# Patient Record
Sex: FEMALE | Race: WHITE | NOT HISPANIC OR LATINO | Employment: FULL TIME | URBAN - METROPOLITAN AREA
[De-identification: names, ages, dates, MRNs, and addresses within clinical notes are randomized per-mention and may not be internally consistent; named-entity substitution may affect disease eponyms.]

---

## 2020-12-21 ENCOUNTER — HOSPITAL ENCOUNTER (OUTPATIENT)
Dept: LAB | Facility: MEDICAL CENTER | Age: 70
End: 2020-12-21
Payer: COMMERCIAL

## 2020-12-21 LAB
COVID ORDER STATUS COVID19: NORMAL
SARS-COV-2 RNA RESP QL NAA+PROBE: NOTDETECTED
SPECIMEN SOURCE: NORMAL

## 2021-01-15 DIAGNOSIS — Z23 NEED FOR VACCINATION: ICD-10-CM

## 2021-08-17 ENCOUNTER — TELEPHONE (OUTPATIENT)
Dept: SCHEDULING | Facility: IMAGING CENTER | Age: 71
End: 2021-08-17

## 2021-09-20 ENCOUNTER — HOSPITAL ENCOUNTER (OUTPATIENT)
Facility: MEDICAL CENTER | Age: 71
End: 2021-09-20
Attending: NURSE PRACTITIONER
Payer: MEDICARE

## 2021-09-20 ENCOUNTER — OFFICE VISIT (OUTPATIENT)
Dept: MEDICAL GROUP | Facility: MEDICAL CENTER | Age: 71
End: 2021-09-20
Payer: MEDICARE

## 2021-09-20 VITALS
DIASTOLIC BLOOD PRESSURE: 58 MMHG | SYSTOLIC BLOOD PRESSURE: 106 MMHG | HEART RATE: 77 BPM | HEIGHT: 68 IN | WEIGHT: 131.17 LBS | OXYGEN SATURATION: 99 % | BODY MASS INDEX: 19.88 KG/M2 | TEMPERATURE: 97.7 F

## 2021-09-20 DIAGNOSIS — F41.9 ANXIETY AND DEPRESSION: ICD-10-CM

## 2021-09-20 DIAGNOSIS — Z01.419 ENCOUNTER FOR GYNECOLOGICAL EXAMINATION WITHOUT ABNORMAL FINDING: ICD-10-CM

## 2021-09-20 DIAGNOSIS — Z12.4 CERVICAL CANCER SCREENING: ICD-10-CM

## 2021-09-20 DIAGNOSIS — Z12.39 BREAST CANCER SCREENING OTHER THAN MAMMOGRAM: ICD-10-CM

## 2021-09-20 DIAGNOSIS — Z98.890 HISTORY OF LOOP ELECTRICAL EXCISION PROCEDURE (LEEP): ICD-10-CM

## 2021-09-20 DIAGNOSIS — R89.8 EOSINOPHIL COUNT RAISED: ICD-10-CM

## 2021-09-20 DIAGNOSIS — D06.9 HIGH GRADE SQUAMOUS INTRAEPITHELIAL LESION (HGSIL), GRADE 3 CIN, ON BIOPSY OF CERVIX: ICD-10-CM

## 2021-09-20 DIAGNOSIS — F32.A ANXIETY AND DEPRESSION: ICD-10-CM

## 2021-09-20 DIAGNOSIS — Z79.890 ON POSTMENOPAUSAL HORMONE REPLACEMENT THERAPY: ICD-10-CM

## 2021-09-20 PROBLEM — Z86.19 HISTORY OF HELICOBACTER PYLORI INFECTION: Status: ACTIVE | Noted: 2021-09-20

## 2021-09-20 PROBLEM — Z86.018 HISTORY OF BENIGN PARATHYROID TUMOR: Status: ACTIVE | Noted: 2021-09-20

## 2021-09-20 PROCEDURE — 99204 OFFICE O/P NEW MOD 45 MIN: CPT | Mod: 25 | Performed by: NURSE PRACTITIONER

## 2021-09-20 PROCEDURE — 88175 CYTOPATH C/V AUTO FLUID REDO: CPT

## 2021-09-20 PROCEDURE — 87624 HPV HI-RISK TYP POOLED RSLT: CPT

## 2021-09-20 PROCEDURE — G0101 CA SCREEN;PELVIC/BREAST EXAM: HCPCS | Performed by: NURSE PRACTITIONER

## 2021-09-20 RX ORDER — MELOXICAM 15 MG/1
15 TABLET ORAL PRN
COMMUNITY
Start: 2021-09-12

## 2021-09-20 RX ORDER — VALACYCLOVIR HYDROCHLORIDE 500 MG/1
500 TABLET, FILM COATED ORAL PRN
COMMUNITY

## 2021-09-20 ASSESSMENT — PATIENT HEALTH QUESTIONNAIRE - PHQ9: CLINICAL INTERPRETATION OF PHQ2 SCORE: 0

## 2021-09-20 NOTE — ASSESSMENT & PLAN NOTE
Chronic issue over recent years.  Her last PCP was also hematologist, they did evaluate this thoroughly and did not find an underlying cause.  She does have allergy issues

## 2021-09-20 NOTE — PROGRESS NOTES
CC:  Pap/Well Woman Exam    History of present illness:  Shabana Bailon is 71 y.o.  postmenopausal female presenting today for well woman exam with gynecological exam and Pap smear.   She reports history of KIKI-3, LEEP procedure.  Follow-up Paps have been normal.  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  Enjoys skiing, exercises regularly.  [place]    She brings in recent labs which I have scanned into media.  A1c is 5.6.  She is on low-dose of Metformin and tolerating this well.  Cholesterol panel normal, TSH normal at 2.09.  Vitamin D also normal at 50.5.  She is on a calcium supplement    On postmenopausal hormone replacement therapy  Managed with topical estrogen and norethindrone 5 mg half tablet daily for 12 days every 3 months  She is feeling well on this balance.  Scheduled for mammogram     Anxiety and depression  Doing well at this time on Wellbutrin 250 mg daily    Eosinophil count raised  Chronic issue over recent years.  Her last PCP was also hematologist, they did evaluate this thoroughly and did not find an underlying cause.  She does have allergy issues      History reviewed. No pertinent past medical history.    Past Surgical History:   Procedure Laterality Date   • LEEP         Outpatient Encounter Medications as of 2021   Medication Sig Dispense Refill   • metformin (GLUCOPHAGE) 1000 MG tablet Take 1,000 mg by mouth every day.     • meloxicam (MOBIC) 15 MG tablet Take 15 mg by mouth as needed.     • buPROPion HCl (WELLBUTRIN PO) Take 250 mg by mouth every day.     • valACYclovir (VALTREX) 500 MG Tab Take 500 mg by mouth as needed.       No facility-administered encounter medications on file as of 2021.       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  Social History     Socioeconomic History   • Marital status: Not on file     Spouse name: Not on file   • Number of children: Not on file   • Years of education: Not on file   • Highest education level: Not on file   Occupational History   • Not on file   Tobacco Use   • Smoking status: Never Smoker   • Smokeless tobacco: Never Used   Vaping Use   • Vaping Use: Never used   Substance and Sexual Activity   • Alcohol use: Yes     Alcohol/week: 3.6 oz     Types: 6 Standard drinks or equivalent per week   • Drug use: Never   • Sexual activity: Not on file   Other Topics Concern   • Not on file   Social History Narrative   • Not on file     Social Determinants of Health     Financial Resource Strain:    • Difficulty of Paying Living Expenses:    Food Insecurity:    • Worried About Running Out of Food in the Last Year:    • Ran Out of Food in the Last Year:    Transportation Needs:    • Lack of Transportation (Medical):    • Lack of Transportation (Non-Medical):    Physical Activity:    • Days of Exercise per Week:    • Minutes of Exercise per Session:    Stress:    • Feeling of Stress :    Social Connections:    • Frequency of Communication with Friends and Family:    • Frequency of Social Gatherings with Friends and Family:    • Attends Church Services:    • Active Member of Clubs or Organizations:    • Attends Club or Organization Meetings:    • Marital Status:    Intimate Partner Violence:    • Fear of Current or Ex-Partner:    • Emotionally Abused:    • Physically Abused:    • Sexually Abused:        History reviewed. No pertinent family history.      ROS: Denies  "Weight loss, fatigue, chest pain, SOB, bowel or bladder changes. No concerning vaginal discharge or irritation, no dyspareunia or postcoital bleeding. Denies h/o migraine with aura. Denies musculoskeletal, neurological, or psychiatric problems.      /58 (BP Location: Right arm, Patient Position: Sitting, BP Cuff Size: Adult)   Pulse 77   Temp 36.5 °C (97.7 °F) (Temporal)   Ht 1.727 m (5' 8\")   Wt 59.5 kg (131 lb 2.8 oz)   SpO2 99%   BMI 19.94 kg/m²     GEN:  Appears well and in no apparent distress   NECK:  Supple without adenopathy or thyromegaly  LUNGS:  Clear and equal. No wheeze, ronchi, or rales.  CV:  RRR, S1, S2. No murmur.  Pedal pulses 2+ bilaterally.  BREAST:  Symmetrical without masses. No nipple discharge.  ABD:  Soft, non-tender, non-distended, normal bowel sounds.  No hepatosplenomegaly.  :  Normal external female genitalia.  Vaginal canal clear.  Cervix appears normal. Specimen collected from transformation zone. Bimanual exam:  No CMT, normal size uterus without masses or tenderness; no adnexal masses or tenderness.      Assessment and plan    1. Encounter for gynecological examination without abnormal finding  Normal GYN exam.  Pap sent, follow-up pending result.  Scheduled for mammogram.  General health and wellness discussion including healthy diet, regular exercise  - THINPREP PAP WITH HPV; Future    2. On postmenopausal hormone replacement therapy  Doing well on current HRT    3. History of loop electrical excision procedure (LEEP)    4. High grade squamous intraepithelial lesion (HGSIL), grade 3 KIKI, on biopsy of cervix  History of LEEP, most recent Pap normal    5. Anxiety and depression  Stable on Wellbutrin    6. Eosinophil count raised  Labs reviewed, scanned into media.  She has had thorough work-up in the past      F/u pending results    "

## 2021-09-20 NOTE — ASSESSMENT & PLAN NOTE
Managed with topical estrogen and norethindrone 5 mg half tablet daily for 12 days every 3 months  She is feeling well on this balance.  Scheduled for mammogram

## 2021-09-21 DIAGNOSIS — Z01.419 ENCOUNTER FOR GYNECOLOGICAL EXAMINATION WITHOUT ABNORMAL FINDING: ICD-10-CM

## 2021-09-21 LAB
CYTOLOGY REG CYTOL: NORMAL
HPV HR 12 DNA CVX QL NAA+PROBE: NEGATIVE
HPV16 DNA SPEC QL NAA+PROBE: NEGATIVE
HPV18 DNA SPEC QL NAA+PROBE: NEGATIVE
SPECIMEN SOURCE: NORMAL

## 2021-12-23 ENCOUNTER — APPOINTMENT (OUTPATIENT)
Dept: MEDICAL GROUP | Facility: MEDICAL CENTER | Age: 71
End: 2021-12-23
Payer: MEDICARE

## 2021-12-23 ENCOUNTER — OFFICE VISIT (OUTPATIENT)
Dept: MEDICAL GROUP | Facility: MEDICAL CENTER | Age: 71
End: 2021-12-23
Payer: MEDICARE

## 2021-12-23 VITALS
BODY MASS INDEX: 19.88 KG/M2 | OXYGEN SATURATION: 92 % | SYSTOLIC BLOOD PRESSURE: 118 MMHG | DIASTOLIC BLOOD PRESSURE: 58 MMHG | HEIGHT: 68 IN | TEMPERATURE: 98 F | HEART RATE: 75 BPM | WEIGHT: 131.17 LBS

## 2021-12-23 DIAGNOSIS — Z23 IMMUNIZATION DUE: ICD-10-CM

## 2021-12-23 DIAGNOSIS — E21.0 PRIMARY HYPERPARATHYROIDISM (HCC): ICD-10-CM

## 2021-12-23 DIAGNOSIS — Z79.890 ON POSTMENOPAUSAL HORMONE REPLACEMENT THERAPY: ICD-10-CM

## 2021-12-23 DIAGNOSIS — R89.8 EOSINOPHIL COUNT RAISED: ICD-10-CM

## 2021-12-23 DIAGNOSIS — D06.9 HIGH GRADE SQUAMOUS INTRAEPITHELIAL LESION (HGSIL), GRADE 3 CIN, ON BIOPSY OF CERVIX: ICD-10-CM

## 2021-12-23 PROBLEM — Z98.890 HISTORY OF LOOP ELECTRICAL EXCISION PROCEDURE (LEEP): Status: RESOLVED | Noted: 2021-09-20 | Resolved: 2021-12-23

## 2021-12-23 PROBLEM — Z86.018 HISTORY OF BENIGN PARATHYROID TUMOR: Status: RESOLVED | Noted: 2021-09-20 | Resolved: 2021-12-23

## 2021-12-23 PROBLEM — Z86.19 HISTORY OF HELICOBACTER PYLORI INFECTION: Status: RESOLVED | Noted: 2021-09-20 | Resolved: 2021-12-23

## 2021-12-23 PROCEDURE — 99213 OFFICE O/P EST LOW 20 MIN: CPT | Mod: 25 | Performed by: FAMILY MEDICINE

## 2021-12-23 PROCEDURE — 90471 IMMUNIZATION ADMIN: CPT | Performed by: FAMILY MEDICINE

## 2021-12-23 PROCEDURE — 90715 TDAP VACCINE 7 YRS/> IM: CPT | Performed by: FAMILY MEDICINE

## 2021-12-23 RX ORDER — VITS A,C,E/LUTEIN/MINERALS 300MCG-200
1 TABLET ORAL DAILY
COMMUNITY

## 2021-12-23 RX ORDER — ESTRADIOL 0.25 MG/.25G
GEL TOPICAL
COMMUNITY
End: 2023-10-23

## 2021-12-23 ASSESSMENT — ENCOUNTER SYMPTOMS
MYALGIAS: 0
ABDOMINAL PAIN: 0
DOUBLE VISION: 0
TINGLING: 0
PALPITATIONS: 0
FEVER: 0
BLURRED VISION: 0
NERVOUS/ANXIOUS: 0
CHILLS: 0
WEAKNESS: 0
SHORTNESS OF BREATH: 0
SORE THROAT: 0
VOMITING: 0
WEIGHT LOSS: 0
DIARRHEA: 0
DIZZINESS: 0
COUGH: 0
NAUSEA: 0
DEPRESSION: 0
CONSTIPATION: 0
BRUISES/BLEEDS EASILY: 0

## 2021-12-23 NOTE — ASSESSMENT & PLAN NOTE
Continues with estradiol 0.25 mg / 0.25 mg gel and norethindrone 5 mg half tablet daily once every 3 months.  Understands the risks of continuing hormone replacement therapy.  Gets annual mammograms, she did miss her mammogram this year, scheduled for one in January 2022

## 2021-12-23 NOTE — ASSESSMENT & PLAN NOTE
This is a chronic condition for the patient in which she reports originally her counts were all the way up to 18.  At that time she also followed with hematology who did an extensive work-up and was told that she just has an elevated eosinophil count.  Most recent blood test back in September shows elevated eosinophil to 7

## 2021-12-23 NOTE — PROGRESS NOTES
Shabana Bailon is a pleasant 71 y.o. female here to establish care.    HPI:   Primary hyperparathyroidism (HCC)  Recent blood work showed a normal PTH level.  Continues to take calcium and Vitamin D replacment    Eosinophil count raised  This is a chronic condition for the patient in which she reports originally her counts were all the way up to 18.  At that time she also followed with hematology who did an extensive work-up and was told that she just has an elevated eosinophil count.  Most recent blood test back in September shows elevated eosinophil to 7    High grade squamous intraepithelial lesion (HGSIL), grade 3 KIKI, on biopsy of cervix  Continues with annual Pap smears.    On postmenopausal hormone replacement therapy  Continues with estradiol 0.25 mg / 0.25 mg gel and norethindrone 5 mg half tablet daily once every 3 months.  Understands the risks of continuing hormone replacement therapy.  Gets annual mammograms, she did miss her mammogram this year, scheduled for one in January 2022      Health Maintenance  Annual wellness visit: Patient is due  The patient is also due for their shingles vaccinations, we were unable to provide them with this in the clinic today.  I did recommend that they can get their shingles vaccine series at their local pharmacy. And her pneumococcal vaccine also shows that it is due. Patient states that she received both of these vaccines while she was in [place] at [facility].  Hepatitis C screening: Patient is due  Mammogram: Patient is overdue.  DEXA scan: Shows that patient is due though she states she had it done either 2 to 4 years ago.    Current medicines (including changes today)  Current Outpatient Medications   Medication Sig Dispense Refill   • NORETHINDRONE ACETATE PO Take  by mouth every 3 months.     • Non Formulary Request Apply  topically as needed. Testosterone cream     • Turmeric (QC TUMERIC COMPLEX PO) Take  by mouth.     • Multiple Vitamins-Minerals  (OCUVITE-LUTEIN) Tab Take 1 Tablet by mouth every day.     • B Complex Vitamins (VITAMIN B COMPLEX PO) Take  by mouth.     • CALCIUM CARBONATE-VIT D-MIN PO Take  by mouth.     • Estradiol 0.25 MG/0.25GM Gel Place  on the skin.     • metformin (GLUCOPHAGE) 1000 MG tablet Take 500 mg by mouth 2 times a day.     • meloxicam (MOBIC) 15 MG tablet Take 15 mg by mouth as needed.     • buPROPion HCl (WELLBUTRIN PO) Take 250 mg by mouth every day.     • valACYclovir (VALTREX) 500 MG Tab Take 500 mg by mouth as needed.       No current facility-administered medications for this visit.       Past Medical/ Surgical History  She  has a past medical history of Allergy, Depression, History of benign parathyroid tumor (2021), History of Helicobacter pylori infection (2021), History of loop electrical excision procedure (LEEP) (2021), and Parathyroid disorder (HCC). She also has no past medical history of Hyperlipidemia or Hypertension.  She  has a past surgical history that includes leep; other; and nasal polypectomy.    Social History  Social History     Tobacco Use   • Smoking status: Never Smoker   • Smokeless tobacco: Never Used   Vaping Use   • Vaping Use: Never used   Substance Use Topics   • Alcohol use: Yes     Alcohol/week: 3.6 oz     Types: 6 Standard drinks or equivalent per week   • Drug use: Never     Social History     Social History Narrative   • Not on file        Family History  Family History   Problem Relation Age of Onset   • Hypertension Mother    • Asthma Mother    • Stroke Father    • No Known Problems Brother    • No Known Problems Brother      Family Status   Relation Name Status   • Mo     • Fa     • Bro Mick Alive   • Bro Lamont Alive         Review of Systems   Constitutional: Negative for chills, fever, malaise/fatigue and weight loss.   HENT: Negative for hearing loss and sore throat.    Eyes: Negative for blurred vision and double vision.   Respiratory: Negative for cough  "and shortness of breath.    Cardiovascular: Negative for chest pain, palpitations and leg swelling.   Gastrointestinal: Negative for abdominal pain, constipation, diarrhea, nausea and vomiting.   Musculoskeletal: Negative for myalgias.   Skin: Negative for rash.   Neurological: Negative for dizziness, tingling and weakness.   Endo/Heme/Allergies: Does not bruise/bleed easily.   Psychiatric/Behavioral: Negative for depression. The patient is not nervous/anxious.          Objective:     /58 (BP Location: Right arm, Patient Position: Sitting, BP Cuff Size: Adult)   Pulse 75   Temp 36.7 °C (98 °F) (Temporal)   Ht 1.727 m (5' 8\")   Wt 59.5 kg (131 lb 2.8 oz)   SpO2 92%  Body mass index is 19.94 kg/m².    Physical Exam  Constitutional:       General: She is not in acute distress.  HENT:      Head: Normocephalic and atraumatic.      Right Ear: There is impacted cerumen.      Left Ear: There is impacted cerumen.   Eyes:      General: Lids are normal.      Extraocular Movements: Extraocular movements intact.      Conjunctiva/sclera: Conjunctivae normal.      Pupils: Pupils are equal, round, and reactive to light.   Neck:      Trachea: Trachea normal.   Cardiovascular:      Rate and Rhythm: Normal rate and regular rhythm.      Heart sounds: Normal heart sounds. No murmur heard.  No friction rub. No gallop.    Pulmonary:      Effort: Pulmonary effort is normal. No accessory muscle usage.      Breath sounds: Normal breath sounds. No wheezing or rales.   Abdominal:      General: Bowel sounds are normal.      Palpations: Abdomen is soft.      Tenderness: There is no abdominal tenderness.   Musculoskeletal:      Cervical back: Normal range of motion and neck supple.      Right lower leg: No edema.      Left lower leg: No edema.   Lymphadenopathy:      Cervical: No cervical adenopathy.   Skin:     General: Skin is warm and dry.      Findings: No rash.   Neurological:      General: No focal deficit present.      Mental " Status: She is alert and oriented to person, place, and time.      GCS: GCS eye subscore is 4. GCS verbal subscore is 5. GCS motor subscore is 6.      Gait: Gait is intact.      Deep Tendon Reflexes:      Reflex Scores:       Brachioradialis reflexes are 2+ on the right side and 2+ on the left side.       Patellar reflexes are 2+ on the right side and 2+ on the left side.  Psychiatric:         Attention and Perception: Attention normal.         Mood and Affect: Affect normal.         Speech: Speech normal.        Imaging:  No recent imaging to review    Labs:  Most recent labs from 09/2021 reviewed with patient.  Assessment and Plan:   The following treatment plan was discussed    1. Primary hyperparathyroidism (HCC)  Chronic, stable.  Recommend that the patient continue taking her calcium and vitamin D supplementation.  I also recommended rechecking her PTH annually.    2. On postmenopausal hormone replacement therapy  Chronic, stable.  I did advise to stop the hormone replacement therapy due to the substantial risk for breast cancer.  Patient acknowledged this risk but would like to continue with her hormone replacement therapy.    3. Eosinophil count raised  Chronic, stable.  We will go ahead and recheck her CBC in September 2022    4. High grade squamous intraepithelial lesion (HGSIL), grade 3 KIKI, on biopsy of cervix  Chronic, stable.  We will go ahead and continue with her annual Paps starting in September.    5. Immunization due  Due for her tetanus, will go get her updated today.  - Tdap Vaccine =>6YO IM      Records requested.  Followup: Return in about 9 months (around 9/23/2022), or if symptoms worsen or fail to improve, for Annual visit.    I have placed vaccination orders.  The MA is preforming vaccination orders under the direction of Dr. Montaño    Please note that this dictation was created using voice recognition software. I have made every reasonable attempt to correct obvious errors, but I expect  that there are errors of grammar and possibly content that I did not discover before finalizing the note.

## 2022-10-14 SDOH — ECONOMIC STABILITY: HOUSING INSECURITY
IN THE LAST 12 MONTHS, WAS THERE A TIME WHEN YOU DID NOT HAVE A STEADY PLACE TO SLEEP OR SLEPT IN A SHELTER (INCLUDING NOW)?: NO

## 2022-10-14 SDOH — HEALTH STABILITY: MENTAL HEALTH
STRESS IS WHEN SOMEONE FEELS TENSE, NERVOUS, ANXIOUS, OR CAN'T SLEEP AT NIGHT BECAUSE THEIR MIND IS TROUBLED. HOW STRESSED ARE YOU?: NOT AT ALL

## 2022-10-14 SDOH — HEALTH STABILITY: PHYSICAL HEALTH: ON AVERAGE, HOW MANY MINUTES DO YOU ENGAGE IN EXERCISE AT THIS LEVEL?: 40 MIN

## 2022-10-14 SDOH — ECONOMIC STABILITY: TRANSPORTATION INSECURITY
IN THE PAST 12 MONTHS, HAS LACK OF RELIABLE TRANSPORTATION KEPT YOU FROM MEDICAL APPOINTMENTS, MEETINGS, WORK OR FROM GETTING THINGS NEEDED FOR DAILY LIVING?: NO

## 2022-10-14 SDOH — ECONOMIC STABILITY: INCOME INSECURITY: IN THE LAST 12 MONTHS, WAS THERE A TIME WHEN YOU WERE NOT ABLE TO PAY THE MORTGAGE OR RENT ON TIME?: NO

## 2022-10-14 SDOH — HEALTH STABILITY: PHYSICAL HEALTH: ON AVERAGE, HOW MANY DAYS PER WEEK DO YOU ENGAGE IN MODERATE TO STRENUOUS EXERCISE (LIKE A BRISK WALK)?: 7 DAYS

## 2022-10-14 SDOH — ECONOMIC STABILITY: FOOD INSECURITY: WITHIN THE PAST 12 MONTHS, YOU WORRIED THAT YOUR FOOD WOULD RUN OUT BEFORE YOU GOT MONEY TO BUY MORE.: NEVER TRUE

## 2022-10-14 SDOH — ECONOMIC STABILITY: HOUSING INSECURITY: IN THE LAST 12 MONTHS, HOW MANY PLACES HAVE YOU LIVED?: 1

## 2022-10-14 SDOH — ECONOMIC STABILITY: TRANSPORTATION INSECURITY
IN THE PAST 12 MONTHS, HAS THE LACK OF TRANSPORTATION KEPT YOU FROM MEDICAL APPOINTMENTS OR FROM GETTING MEDICATIONS?: NO

## 2022-10-14 SDOH — ECONOMIC STABILITY: INCOME INSECURITY: HOW HARD IS IT FOR YOU TO PAY FOR THE VERY BASICS LIKE FOOD, HOUSING, MEDICAL CARE, AND HEATING?: NOT HARD AT ALL

## 2022-10-14 SDOH — ECONOMIC STABILITY: FOOD INSECURITY: WITHIN THE PAST 12 MONTHS, THE FOOD YOU BOUGHT JUST DIDN'T LAST AND YOU DIDN'T HAVE MONEY TO GET MORE.: NEVER TRUE

## 2022-10-14 SDOH — ECONOMIC STABILITY: TRANSPORTATION INSECURITY
IN THE PAST 12 MONTHS, HAS LACK OF TRANSPORTATION KEPT YOU FROM MEETINGS, WORK, OR FROM GETTING THINGS NEEDED FOR DAILY LIVING?: NO

## 2022-10-14 ASSESSMENT — SOCIAL DETERMINANTS OF HEALTH (SDOH)
DO YOU BELONG TO ANY CLUBS OR ORGANIZATIONS SUCH AS CHURCH GROUPS UNIONS, FRATERNAL OR ATHLETIC GROUPS, OR SCHOOL GROUPS?: YES
HOW OFTEN DO YOU GET TOGETHER WITH FRIENDS OR RELATIVES?: MORE THAN THREE TIMES A WEEK
HOW OFTEN DO YOU HAVE SIX OR MORE DRINKS ON ONE OCCASION: NEVER
HOW OFTEN DO YOU ATTEND CHURCH OR RELIGIOUS SERVICES?: 1 TO 4 TIMES PER YEAR
HOW OFTEN DO YOU GET TOGETHER WITH FRIENDS OR RELATIVES?: MORE THAN THREE TIMES A WEEK
HOW MANY DRINKS CONTAINING ALCOHOL DO YOU HAVE ON A TYPICAL DAY WHEN YOU ARE DRINKING: PATIENT DOES NOT DRINK
HOW HARD IS IT FOR YOU TO PAY FOR THE VERY BASICS LIKE FOOD, HOUSING, MEDICAL CARE, AND HEATING?: NOT HARD AT ALL
WITHIN THE PAST 12 MONTHS, YOU WORRIED THAT YOUR FOOD WOULD RUN OUT BEFORE YOU GOT THE MONEY TO BUY MORE: NEVER TRUE
HOW OFTEN DO YOU ATTEND CHURCH OR RELIGIOUS SERVICES?: 1 TO 4 TIMES PER YEAR
HOW OFTEN DO YOU HAVE A DRINK CONTAINING ALCOHOL: MONTHLY OR LESS
IN A TYPICAL WEEK, HOW MANY TIMES DO YOU TALK ON THE PHONE WITH FAMILY, FRIENDS, OR NEIGHBORS?: MORE THAN THREE TIMES A WEEK
DO YOU BELONG TO ANY CLUBS OR ORGANIZATIONS SUCH AS CHURCH GROUPS UNIONS, FRATERNAL OR ATHLETIC GROUPS, OR SCHOOL GROUPS?: YES
HOW OFTEN DO YOU ATTENT MEETINGS OF THE CLUB OR ORGANIZATION YOU BELONG TO?: MORE THAN 4 TIMES PER YEAR
HOW OFTEN DO YOU ATTENT MEETINGS OF THE CLUB OR ORGANIZATION YOU BELONG TO?: MORE THAN 4 TIMES PER YEAR
IN A TYPICAL WEEK, HOW MANY TIMES DO YOU TALK ON THE PHONE WITH FAMILY, FRIENDS, OR NEIGHBORS?: MORE THAN THREE TIMES A WEEK

## 2022-10-14 ASSESSMENT — LIFESTYLE VARIABLES
AUDIT-C TOTAL SCORE: 1
SKIP TO QUESTIONS 9-10: 1
HOW OFTEN DO YOU HAVE SIX OR MORE DRINKS ON ONE OCCASION: NEVER
HOW OFTEN DO YOU HAVE A DRINK CONTAINING ALCOHOL: MONTHLY OR LESS
HOW MANY STANDARD DRINKS CONTAINING ALCOHOL DO YOU HAVE ON A TYPICAL DAY: PATIENT DOES NOT DRINK

## 2022-10-17 ENCOUNTER — HOSPITAL ENCOUNTER (OUTPATIENT)
Facility: MEDICAL CENTER | Age: 72
End: 2022-10-17
Attending: FAMILY MEDICINE
Payer: MEDICARE

## 2022-10-17 ENCOUNTER — OFFICE VISIT (OUTPATIENT)
Dept: MEDICAL GROUP | Facility: MEDICAL CENTER | Age: 72
End: 2022-10-17
Payer: MEDICARE

## 2022-10-17 VITALS
WEIGHT: 127 LBS | SYSTOLIC BLOOD PRESSURE: 122 MMHG | HEIGHT: 68 IN | OXYGEN SATURATION: 99 % | DIASTOLIC BLOOD PRESSURE: 74 MMHG | TEMPERATURE: 98 F | HEART RATE: 69 BPM | BODY MASS INDEX: 19.25 KG/M2

## 2022-10-17 DIAGNOSIS — Z01.419 ENCOUNTER FOR WELL WOMAN EXAM WITH ROUTINE GYNECOLOGICAL EXAM: Primary | ICD-10-CM

## 2022-10-17 PROCEDURE — 99000 SPECIMEN HANDLING OFFICE-LAB: CPT | Performed by: FAMILY MEDICINE

## 2022-10-17 PROCEDURE — 88175 CYTOPATH C/V AUTO FLUID REDO: CPT

## 2022-10-17 PROCEDURE — 99213 OFFICE O/P EST LOW 20 MIN: CPT | Performed by: FAMILY MEDICINE

## 2022-10-17 PROCEDURE — 87624 HPV HI-RISK TYP POOLED RSLT: CPT

## 2022-10-17 ASSESSMENT — PATIENT HEALTH QUESTIONNAIRE - PHQ9: CLINICAL INTERPRETATION OF PHQ2 SCORE: 0

## 2022-10-17 NOTE — PROGRESS NOTES
Subjective:     CC: Routine gynecological exam.      HPI:   Shabana Bailon is a 72 y.o. female who presents for annual exam    Problem   Encounter for Well Woman Exam With Routine Gynecological Exam    Patient has GYN provider: No   Last Pap Smear: 2021   H/O Abnormal Pap: Yes, 4 to 5 years ago LEEP HPV  Last Mammogram: 2022, abnormal to left breast, further imaging normal  Last Bone Density Test: 2022, osteopenia  Last Colorectal Cancer Screenin2020, cologuard  Cholesterol Screening: No copies in the chart, but completed recently   Diabetes Screening: No copies in the chart, but completed recently    Exercise: strenuous regular exercise, aerobic > 3 hours a week  Diet: Regular      No LMP recorded.  She has utilized hormone replacement therapy, transdermal.  Denies any menopausal symptoms.  No significant bloating/fluid retention, pelvic pain, or dyspareunia. No abnormal vaginal discharge.   No breast tenderness, mass, nipple discharge or changes in size or contour.    Advanced directive: Has completed, but not in the chart   Substance Abuse: No concerns  Safe in relationship.  Seat belts, bike helmet, gun safety discussed.  Sun protection used.    Immunizations  Influenza: Completed   HPV series: Aged out   Tetanus: 2021    Shingles: Completed   COVID: Due for COVID booster  Pneumococcal : Completed    Other immunizations: Hep B due           OB History   No obstetric history on file.      She  reports that she is not currently sexually active.    She  has a past medical history of Allergy, Depression, History of benign parathyroid tumor (2021), History of Helicobacter pylori infection (2021), History of loop electrical excision procedure (LEEP) (2021), and Parathyroid disorder (HCC).    She has no past medical history of Hyperlipidemia or Hypertension.  She  has a past surgical history that includes leep; other; and nasal polypectomy.    Family History   Problem Relation Age of  Onset    Hypertension Mother     Asthma Mother     Stroke Father     No Known Problems Brother     No Known Problems Brother      Social History     Tobacco Use    Smoking status: Never    Smokeless tobacco: Never   Vaping Use    Vaping Use: Never used   Substance Use Topics    Alcohol use: Yes     Alcohol/week: 3.6 oz     Types: 6 Standard drinks or equivalent per week    Drug use: Never       Patient Active Problem List    Diagnosis Date Noted    Encounter for well woman exam with routine gynecological exam 10/17/2022    On postmenopausal hormone replacement therapy 09/20/2021    High grade squamous intraepithelial lesion (HGSIL), grade 3 KIKI, on biopsy of cervix 09/20/2021    Anxiety and depression 09/20/2021    Eosinophil count raised 09/20/2021    Primary hyperparathyroidism (HCC) 09/26/2011     Current Outpatient Medications   Medication Sig Dispense Refill    CALCIUM CARBONATE-VIT D-MIN PO Take  by mouth.      Estradiol 0.25 MG/0.25GM Gel Place  on the skin.      NORETHINDRONE ACETATE PO Take  by mouth every 3 months.      Non Formulary Request Apply  topically as needed. Testosterone cream      Turmeric (QC TUMERIC COMPLEX PO) Take  by mouth.      Multiple Vitamins-Minerals (OCUVITE-LUTEIN) Tab Take 1 Tablet by mouth every day.      B Complex Vitamins (VITAMIN B COMPLEX PO) Take  by mouth.      metformin (GLUCOPHAGE) 1000 MG tablet Take 500 mg by mouth 2 times a day.      meloxicam (MOBIC) 15 MG tablet Take 15 mg by mouth as needed.      buPROPion HCl (WELLBUTRIN PO) Take 250 mg by mouth every day.      valACYclovir (VALTREX) 500 MG Tab Take 500 mg by mouth as needed.       No current facility-administered medications for this visit.     Allergies   Allergen Reactions    Codeine Vomiting    Penicillins Hives and Rash       Review of Systems   Constitutional: Negative for fever, chills and malaise/fatigue.   HENT: Negative for congestion.    Eyes: Negative for pain.   Respiratory: Negative for cough and  "shortness of breath.    Cardiovascular: Negative for chest pain and leg swelling.   Gastrointestinal: Negative for nausea, vomiting, abdominal pain and diarrhea.   Genitourinary: Negative for dysuria and hematuria.   Skin: Negative for rash.   Neurological: Negative for dizziness, focal weakness and headaches.   Endo/Heme/Allergies: Does not bruise/bleed easily.   Psychiatric/Behavioral: Negative for depression.  The patient is not nervous/anxious.      Objective:   /74 (BP Location: Left arm, Patient Position: Sitting, BP Cuff Size: Adult)   Pulse 69   Temp 36.7 °C (98 °F) (Temporal)   Ht 1.727 m (5' 8\")   Wt 57.6 kg (127 lb)   SpO2 99%   BMI 19.31 kg/m²     Wt Readings from Last 4 Encounters:   10/17/22 57.6 kg (127 lb)   12/23/21 59.5 kg (131 lb 2.8 oz)   09/20/21 59.5 kg (131 lb 2.8 oz)       A chaperone was offered to the patient during today's exam. Chaperone name: DERRELL Haddad was present.    Physical Exam  Exam conducted with a chaperone present.   Constitutional:       General: She is not in acute distress.  HENT:      Head: Normocephalic and atraumatic.   Eyes:      Conjunctiva/sclera: Conjunctivae normal.      Pupils: Pupils are equal, round, and reactive to light.   Pulmonary:      Effort: Pulmonary effort is normal. No respiratory distress.   Abdominal:      General: There is no distension.   Genitourinary:     Pubic Area: No rash.       Labia:         Right: No rash, tenderness or lesion.         Left: No rash, tenderness or lesion.       Vagina: Normal. No tenderness.      Cervix: No cervical motion tenderness, friability, lesion or erythema.      Uterus: Normal.       Adnexa: Right adnexa normal and left adnexa normal.   Musculoskeletal:      Cervical back: Normal range of motion and neck supple.   Skin:     General: Skin is warm and dry.      Findings: No rash.   Neurological:      Mental Status: She is alert and oriented to person, place, and time.      Gait: Gait is intact. "   Psychiatric:         Mood and Affect: Affect normal.       Imaging:  No recent imaging to review    Labs:  No recent labs to review    Assessment and Plan:        Problem List Items Addressed This Visit       Encounter for well woman exam with routine gynecological exam - Primary     Patient Counseling:  --Discussed moderation in sodium/caffeine intake, saturated fat and cholesterol, caloric balance, sufficient fresh fruits/vegetables, fiber, iron, and 0.4-0.8mg of folate supplement per day (for females capable of pregnancy).  --Discussed brushing, flossing, and dental visits.   --Encouraged regular exercise, 150 mins a week of moderate to high intensity cardiovascular activity.   --Discussed tobacco, alcohol, or other drug use; availability of treatment for abuse.   --Discussed sexually transmitted infections, partner selection, use of condoms, avoidance of unintended pregnancy and contraceptive alternatives.  --Injury prevention: Discussed safety belts, safety helmets, smoke detector, etc.  -Discussed  breast self exam, mammography screening, use and side effects of HRT     Health maintenance:  Due for COVID booster   Patient counseled about skin care, diet, supplements, and exercise.  Labs per orders         Relevant Orders    THINPREP PAP WITH HPV        Follow-up: Return in about 1 year (around 10/17/2023), or if symptoms worsen or fail to improve, for annual.         Please note that this dictation was created using voice recognition software. I have made every reasonable attempt to correct obvious errors, but I expect that there are errors of grammar and possibly content that I did not discover before finalizing the note.

## 2022-10-17 NOTE — ASSESSMENT & PLAN NOTE
Patient Counseling:  --Discussed moderation in sodium/caffeine intake, saturated fat and cholesterol, caloric balance, sufficient fresh fruits/vegetables, fiber, iron, and 0.4-0.8mg of folate supplement per day (for females capable of pregnancy).  --Discussed brushing, flossing, and dental visits.   --Encouraged regular exercise, 150 mins a week of moderate to high intensity cardiovascular activity.   --Discussed tobacco, alcohol, or other drug use; availability of treatment for abuse.   --Discussed sexually transmitted infections, partner selection, use of condoms, avoidance of unintended pregnancy and contraceptive alternatives.  --Injury prevention: Discussed safety belts, safety helmets, smoke detector, etc.  -Discussed  breast self exam, mammography screening, use and side effects of HRT     Health maintenance:  Due for COVID booster   Patient counseled about skin care, diet, supplements, and exercise.  Labs per orders

## 2022-10-18 DIAGNOSIS — Z01.419 ENCOUNTER FOR WELL WOMAN EXAM WITH ROUTINE GYNECOLOGICAL EXAM: ICD-10-CM

## 2022-11-09 ENCOUNTER — PATIENT MESSAGE (OUTPATIENT)
Dept: HEALTH INFORMATION MANAGEMENT | Facility: OTHER | Age: 72
End: 2022-11-09

## 2023-10-20 SDOH — ECONOMIC STABILITY: INCOME INSECURITY: IN THE LAST 12 MONTHS, WAS THERE A TIME WHEN YOU WERE NOT ABLE TO PAY THE MORTGAGE OR RENT ON TIME?: NO

## 2023-10-20 SDOH — ECONOMIC STABILITY: INCOME INSECURITY: HOW HARD IS IT FOR YOU TO PAY FOR THE VERY BASICS LIKE FOOD, HOUSING, MEDICAL CARE, AND HEATING?: NOT HARD AT ALL

## 2023-10-20 SDOH — ECONOMIC STABILITY: FOOD INSECURITY: WITHIN THE PAST 12 MONTHS, YOU WORRIED THAT YOUR FOOD WOULD RUN OUT BEFORE YOU GOT MONEY TO BUY MORE.: NEVER TRUE

## 2023-10-20 SDOH — HEALTH STABILITY: PHYSICAL HEALTH: ON AVERAGE, HOW MANY MINUTES DO YOU ENGAGE IN EXERCISE AT THIS LEVEL?: 60 MIN

## 2023-10-20 SDOH — ECONOMIC STABILITY: HOUSING INSECURITY: IN THE LAST 12 MONTHS, HOW MANY PLACES HAVE YOU LIVED?: 1

## 2023-10-20 SDOH — ECONOMIC STABILITY: FOOD INSECURITY: WITHIN THE PAST 12 MONTHS, THE FOOD YOU BOUGHT JUST DIDN'T LAST AND YOU DIDN'T HAVE MONEY TO GET MORE.: NEVER TRUE

## 2023-10-20 SDOH — HEALTH STABILITY: PHYSICAL HEALTH: ON AVERAGE, HOW MANY DAYS PER WEEK DO YOU ENGAGE IN MODERATE TO STRENUOUS EXERCISE (LIKE A BRISK WALK)?: 7 DAYS

## 2023-10-20 ASSESSMENT — SOCIAL DETERMINANTS OF HEALTH (SDOH)
HOW MANY DRINKS CONTAINING ALCOHOL DO YOU HAVE ON A TYPICAL DAY WHEN YOU ARE DRINKING: 1 OR 2
HOW OFTEN DO YOU ATTEND CHURCH OR RELIGIOUS SERVICES?: 1 TO 4 TIMES PER YEAR
HOW OFTEN DO YOU ATTEND CHURCH OR RELIGIOUS SERVICES?: 1 TO 4 TIMES PER YEAR
DO YOU BELONG TO ANY CLUBS OR ORGANIZATIONS SUCH AS CHURCH GROUPS UNIONS, FRATERNAL OR ATHLETIC GROUPS, OR SCHOOL GROUPS?: YES
HOW OFTEN DO YOU GET TOGETHER WITH FRIENDS OR RELATIVES?: MORE THAN THREE TIMES A WEEK
HOW OFTEN DO YOU ATTENT MEETINGS OF THE CLUB OR ORGANIZATION YOU BELONG TO?: MORE THAN 4 TIMES PER YEAR
HOW OFTEN DO YOU ATTENT MEETINGS OF THE CLUB OR ORGANIZATION YOU BELONG TO?: MORE THAN 4 TIMES PER YEAR
HOW OFTEN DO YOU HAVE A DRINK CONTAINING ALCOHOL: 2-4 TIMES A MONTH
DO YOU BELONG TO ANY CLUBS OR ORGANIZATIONS SUCH AS CHURCH GROUPS UNIONS, FRATERNAL OR ATHLETIC GROUPS, OR SCHOOL GROUPS?: YES
IN A TYPICAL WEEK, HOW MANY TIMES DO YOU TALK ON THE PHONE WITH FAMILY, FRIENDS, OR NEIGHBORS?: MORE THAN THREE TIMES A WEEK
HOW HARD IS IT FOR YOU TO PAY FOR THE VERY BASICS LIKE FOOD, HOUSING, MEDICAL CARE, AND HEATING?: NOT HARD AT ALL
WITHIN THE PAST 12 MONTHS, YOU WORRIED THAT YOUR FOOD WOULD RUN OUT BEFORE YOU GOT THE MONEY TO BUY MORE: NEVER TRUE
HOW OFTEN DO YOU HAVE SIX OR MORE DRINKS ON ONE OCCASION: NEVER
IN A TYPICAL WEEK, HOW MANY TIMES DO YOU TALK ON THE PHONE WITH FAMILY, FRIENDS, OR NEIGHBORS?: MORE THAN THREE TIMES A WEEK
HOW OFTEN DO YOU GET TOGETHER WITH FRIENDS OR RELATIVES?: MORE THAN THREE TIMES A WEEK

## 2023-10-20 ASSESSMENT — LIFESTYLE VARIABLES
SKIP TO QUESTIONS 9-10: 1
AUDIT-C TOTAL SCORE: 2
HOW MANY STANDARD DRINKS CONTAINING ALCOHOL DO YOU HAVE ON A TYPICAL DAY: 1 OR 2
HOW OFTEN DO YOU HAVE A DRINK CONTAINING ALCOHOL: 2-4 TIMES A MONTH
HOW OFTEN DO YOU HAVE SIX OR MORE DRINKS ON ONE OCCASION: NEVER

## 2023-10-23 ENCOUNTER — HOSPITAL ENCOUNTER (OUTPATIENT)
Facility: MEDICAL CENTER | Age: 73
End: 2023-10-23
Attending: FAMILY MEDICINE
Payer: MEDICARE

## 2023-10-23 ENCOUNTER — OFFICE VISIT (OUTPATIENT)
Dept: MEDICAL GROUP | Facility: MEDICAL CENTER | Age: 73
End: 2023-10-23
Payer: MEDICARE

## 2023-10-23 VITALS
HEART RATE: 87 BPM | SYSTOLIC BLOOD PRESSURE: 112 MMHG | OXYGEN SATURATION: 97 % | BODY MASS INDEX: 20.04 KG/M2 | WEIGHT: 131.8 LBS | DIASTOLIC BLOOD PRESSURE: 60 MMHG | TEMPERATURE: 97.3 F

## 2023-10-23 DIAGNOSIS — Z01.419 ENCOUNTER FOR WELL WOMAN EXAM WITH ROUTINE GYNECOLOGICAL EXAM: ICD-10-CM

## 2023-10-23 DIAGNOSIS — Z00.00 ENCOUNTER FOR ANNUAL WELLNESS EXAM IN MEDICARE PATIENT: Primary | ICD-10-CM

## 2023-10-23 PROCEDURE — 3078F DIAST BP <80 MM HG: CPT | Performed by: FAMILY MEDICINE

## 2023-10-23 PROCEDURE — 87624 HPV HI-RISK TYP POOLED RSLT: CPT

## 2023-10-23 PROCEDURE — G0439 PPPS, SUBSEQ VISIT: HCPCS | Performed by: FAMILY MEDICINE

## 2023-10-23 PROCEDURE — 3074F SYST BP LT 130 MM HG: CPT | Performed by: FAMILY MEDICINE

## 2023-10-23 PROCEDURE — 88175 CYTOPATH C/V AUTO FLUID REDO: CPT

## 2023-10-23 RX ORDER — ESTRADIOL 0.75 MG/1.25G
GEL, METERED TOPICAL
COMMUNITY

## 2023-10-23 ASSESSMENT — ACTIVITIES OF DAILY LIVING (ADL): BATHING_REQUIRES_ASSISTANCE: 0

## 2023-10-23 ASSESSMENT — ENCOUNTER SYMPTOMS: GENERAL WELL-BEING: EXCELLENT

## 2023-10-23 ASSESSMENT — PATIENT HEALTH QUESTIONNAIRE - PHQ9: CLINICAL INTERPRETATION OF PHQ2 SCORE: 0

## 2023-10-23 NOTE — PROGRESS NOTES
Chief Complaint   Patient presents with    Annual Exam    Gynecologic Exam       HPI:  Shabana Bailon is a 73 y.o. here for Medicare Annual Wellness Visit     Patient Active Problem List    Diagnosis Date Noted    Encounter for annual wellness exam in Medicare patient 10/23/2023    Encounter for well woman exam with routine gynecological exam 10/17/2022    On postmenopausal hormone replacement therapy 09/20/2021    High grade squamous intraepithelial lesion (HGSIL), grade 3 KIKI, on biopsy of cervix 09/20/2021    Anxiety and depression 09/20/2021    Eosinophil count raised 09/20/2021    Primary hyperparathyroidism (HCC) 09/26/2011       Current Outpatient Medications   Medication Sig Dispense Refill    Estradiol (ESTROGEL) 0.75 MG/1.25 GM (0.06%) Gel EstroGel 1.25 gram/actuation (0.06%) transdermal gel pump      CALCIUM CARBONATE-VIT D-MIN PO Take  by mouth.      NORETHINDRONE ACETATE PO Take  by mouth every 3 months.      Non Formulary Request Apply  topically as needed. Testosterone cream      Turmeric (QC TUMERIC COMPLEX PO) Take  by mouth.      Multiple Vitamins-Minerals (OCUVITE-LUTEIN) Tab Take 1 Tablet by mouth every day.      B Complex Vitamins (VITAMIN B COMPLEX PO) Take  by mouth.      metformin (GLUCOPHAGE) 1000 MG tablet Take 500 mg by mouth 2 times a day.      meloxicam (MOBIC) 15 MG tablet Take 15 mg by mouth as needed.      buPROPion HCl (WELLBUTRIN PO) Take 250 mg by mouth every day.      valACYclovir (VALTREX) 500 MG Tab Take 500 mg by mouth as needed.       No current facility-administered medications for this visit.          Current supplements as per medication list.     Allergies: Codeine and Penicillins    Current social contact/activities: Big community, golf and playing cards. Still working     She  reports that she has never smoked. She has never used smokeless tobacco. She reports current alcohol use of about 3.6 oz of alcohol per week. She reports that she does not use drugs.  Counseling  given: Not Answered      ROS:    Gait: Uses no assistive device  Ostomy: No  Other tubes: No  Amputations: No  Chronic oxygen use: No  Last eye exam: 5-6 ago  Wears hearing aids: No   : Reports urinary leakage during the last 6 months that has not interfered at all with their daily activities or sleep.    Screening:    Depression Screening  Little interest or pleasure in doing things?  0 - not at all  Feeling down, depressed , or hopeless? 0 - not at all  Patient Health Questionnaire Score: 0     If depressive symptoms identified deferred to follow up visit unless specifically addressed in assessment and plan.    Interpretation of PHQ-9 Total Score   Score Severity   1-4 No Depression   5-9 Mild Depression   10-14 Moderate Depression   15-19 Moderately Severe Depression   20-27 Severe Depression    Screening for Cognitive Impairment  Do you or any of your friends or family members have any concern about your memory? No  Three Minute Recall (Banana, Sunrise, Chair) 2/3    Robert clock face with all 12 numbers and set the hands to show 20 past 8.  Yes    Cognitive concerns identified deferred for follow up unless specifically addressed in assessment and plan.    Fall Risk Assessment  Has the patient had two or more falls in the last year or any fall with injury in the last year?  No    Safety Assessment  Do you always wear your seatbelt?  Yes  Any changes to home needed to function safely? No  Difficulty hearing.  No  Patient counseled about all safety risks that were identified.    Functional Assessment ADLs  Are there any barriers preventing you from cooking for yourself or meeting nutritional needs?  No.    Are there any barriers preventing you from driving safely or obtaining transportation?  No.    Are there any barriers preventing you from using a telephone or calling for help?  No    Are there any barriers preventing you from shopping?  No.    Are there any barriers preventing you from taking care of your own  finances?  No    Are there any barriers preventing you from managing your medications?  No    Are there any barriers preventing you from showering, bathing or dressing yourself? No    Are there any barriers preventing you from doing housework or laundry? No  Are there any barriers preventing you from using the toilet?No  Are you currently engaging in any exercise or physical activity?  Yes.      Self-Assessment of Health  What is your perception of your health? Excellent  Do you sleep more than six hours a night? Yes  In the past 7 days, how much did pain keep you from doing your normal work? None  Do you spend quality time with family or friends (virtually or in person)? Yes  Do you usually eat a heart healthy diet that constists of a variety of fruits, vegetables, whole grains and fiber? Yes  Do you eat foods high in fat and/or Fast Food more than three times per week? No    Advance Care Planning  Do you have an Advance Directive, Living Will, Durable Power of , or POLST? Yes          is on file      Health Maintenance Summary            Overdue - Hepatitis C Screening (Once) Overdue - never done      No completion history exists for this topic.              Postponed - COVID-19 Vaccine (4 - Booster for Marge series) Postponed until 10/23/2024      11/01/2021  Imm Admin: Marge SARS-CoV-2 Vaccine    03/11/2021  Imm Admin: Marge SARS-CoV-2 Vaccine    03/11/2021  Imm Admin: Marge SARS-CoV-2 Vaccine              Mammogram (Every 2 Years) Tentatively due on 1/7/2024 01/07/2022  Outside Procedure: MP-AFICO-WLGY W/ IMPLANTS UNI W/O CAD    11/19/2020  Done              Annual Wellness Visit (Every 366 Days) Next due on 10/23/2024      10/23/2023  Subsequent Annual Wellness Visit - Includes PPPS ()              Colorectal Cancer Screening (Colon Cancer Screening Cologuard Stool (FIT DNA) - Every 3 Years) Tentatively due on 8/14/2026 08/14/2023  COLOGUARD COLON CANCER SCREENING    08/03/2020   COLOGUARD COLON CANCER SCREENING              Bone Density Scan (Every 5 Years) Tentatively due on 2/1/2027 02/01/2022  Outside Procedure: DS-BONE DENSITY STUDY (DEXA)              IMM DTaP/Tdap/Td Vaccine (2 - Td or Tdap) Next due on 12/23/2031 12/23/2021  Imm Admin: Tdap Vaccine              Pneumococcal Vaccine: 65+ Years (Series Information) Completed      07/20/2020  Imm Admin: Pneumococcal polysaccharide vaccine (PPSV-23)    07/02/2019  Imm Admin: Pneumococcal Conjugate Vaccine (Prevnar/PCV-13)              Influenza Vaccine (Series Information) Completed      09/13/2023  Imm Admin: Influenza Vaccine Adult HD    09/26/2022  Imm Admin: Influenza Vaccine Adult HD    10/15/2021  Imm Admin: Influenza Vaccine Adult HD    10/01/2018  Imm Admin: Influenza Vaccine Quad Inj (Pf)              Hepatitis A Vaccine (Hep A) (Series Information) Aged Out      No completion history exists for this topic.              Hepatitis B Vaccine (Hep B) (Series Information) Aged Out      No completion history exists for this topic.              HPV Vaccines (Series Information) Aged Out      No completion history exists for this topic.              Polio Vaccine (Inactivated Polio) (Series Information) Aged Out      No completion history exists for this topic.              Meningococcal Immunization (Series Information) Aged Out      No completion history exists for this topic.              Discontinued - Cervical Cancer Screening  Ordered on 10/23/2023        Frequency changed to Never automatically (Topic No Longer Applies)    10/17/2022  THINPREP PAP WITH HPV    10/17/2022  Pathology Gynecology Specimen    09/20/2021  THINPREP PAP WITH HPV    09/20/2021  Pathology Gynecology Specimen              Discontinued - Zoster (Shingles) Vaccines  Discontinued      11/07/2016  Imm Admin: Zoster Vaccine Live (ZVL) (Zostavax) - HISTORICAL DATA                    Patient Care Team:  CHARLES Yu as PCP - General (Family  Medicine)        Social History     Tobacco Use    Smoking status: Never    Smokeless tobacco: Never   Vaping Use    Vaping Use: Never used   Substance Use Topics    Alcohol use: Yes     Alcohol/week: 3.6 oz     Types: 6 Standard drinks or equivalent per week    Drug use: Never     Family History   Problem Relation Age of Onset    Hypertension Mother     Asthma Mother     Stroke Father     No Known Problems Brother     No Known Problems Brother      She  has a past medical history of Allergy, Depression, 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    She has no past medical history of Hyperlipidemia or Hypertension.   Past Surgical History:   Procedure Laterality Date    LEEP      NASAL POLYPECTOMY      OTHER      parathyroid surgery       Exam:   /60 (BP Location: Right arm, Patient Position: Sitting, BP Cuff Size: Adult)   Pulse 87   Temp 36.3 °C (97.3 °F) (Temporal)   Wt 59.8 kg (131 lb 12.8 oz)   SpO2 97%  Body mass index is 20.04 kg/m².    Hearing excellent.    Dentition good  Alert, oriented in no acute distress.  Eye contact is good, speech goal directed, affect calm    Physical Exam  Exam conducted with a chaperone present.   Constitutional:       General: She is not in acute distress.  HENT:      Head: Normocephalic and atraumatic.      Right Ear: Tympanic membrane and external ear normal.      Left Ear: Tympanic membrane and external ear normal.      Nose: No nasal deformity.      Mouth/Throat:      Lips: Pink.      Mouth: Mucous membranes are moist.      Pharynx: Oropharynx is clear. Uvula midline. No posterior oropharyngeal erythema.   Eyes:      General: Lids are normal.      Extraocular Movements: Extraocular movements intact.      Conjunctiva/sclera: Conjunctivae normal.      Pupils: Pupils are equal, round, and reactive to light.    Neck:      Trachea: Trachea normal.   Cardiovascular:      Rate and Rhythm: Normal rate and regular rhythm.      Heart sounds: Normal heart sounds. No murmur heard.     No friction rub. No gallop.   Pulmonary:      Effort: Pulmonary effort is normal. No accessory muscle usage.      Breath sounds: Normal breath sounds. No wheezing or rales.   Abdominal:      General: Bowel sounds are normal.      Palpations: Abdomen is soft.      Tenderness: There is no abdominal tenderness.   Genitourinary:     Pubic Area: No rash.       Labia:         Right: No rash, tenderness or lesion.         Left: No rash, tenderness or lesion.       Vagina: Normal. No tenderness.      Cervix: No cervical motion tenderness, friability, lesion or erythema.      Uterus: Normal.       Adnexa: Right adnexa normal and left adnexa normal.   Musculoskeletal:      Cervical back: Normal range of motion and neck supple.      Right lower leg: No edema.      Left lower leg: No edema.   Lymphadenopathy:      Cervical: No cervical adenopathy.   Skin:     General: Skin is warm and dry.      Findings: No rash.   Neurological:      General: No focal deficit present.      Mental Status: She is alert and oriented to person, place, and time. Mental status is at baseline.      GCS: GCS eye subscore is 4. GCS verbal subscore is 5. GCS motor subscore is 6.      Motor: No weakness.      Gait: Gait is intact.   Psychiatric:         Attention and Perception: Attention normal.         Mood and Affect: Mood and affect normal.         Speech: Speech normal.       Assessment and Plan. The following treatment and monitoring plan is recommended:    Problem List Items Addressed This Visit       Encounter for well woman exam with routine gynecological exam    Relevant Orders    THINPREP PAP WITH HPV    Encounter for annual wellness exam in Medicare patient - Primary     Services suggested: No services needed at this time  Health Care Screening: Age-appropriate preventive  services recommended by USPTF and ACIP covered by Medicare were discussed today. Services ordered if indicated and agreed upon by the patient.  Referrals offered: Community-based lifestyle interventions to reduce health risks and promote self-management and wellness, fall prevention, nutrition, physical activity, tobacco-use cessation, weight loss, and mental health services as per orders if indicated.    Discussion today about general wellness and lifestyle habits:    Prevent falls and reduce trip hazards; Cautioned about securing or removing rugs.  Have a working fire alarm and carbon monoxide detector;   Engage in regular physical activity and social activities          Relevant Orders    Subsequent Annual Wellness Visit - Includes PPPS () (Completed)       Follow-up: Return in about 1 year (around 10/23/2024) for Annual.    Please note that this dictation was created using voice recognition software. I have made every reasonable attempt to correct obvious errors, but I expect that there are errors of grammar and possibly content that I did not discover before finalizing the note.

## 2023-10-24 DIAGNOSIS — Z01.419 ENCOUNTER FOR WELL WOMAN EXAM WITH ROUTINE GYNECOLOGICAL EXAM: ICD-10-CM

## 2023-10-27 LAB
CYTOLOGIST CVX/VAG CYTO: NORMAL
CYTOLOGY CVX/VAG DOC CYTO: NORMAL
CYTOLOGY CVX/VAG DOC THIN PREP: NORMAL
HPV I/H RISK 4 DNA CVX QL PROBE+SIG AMP: NEGATIVE
NOTE NL11727A: NORMAL
OTHER STN SPEC: NORMAL
STAT OF ADQ CVX/VAG CYTO-IMP: NORMAL

## 2024-01-11 ENCOUNTER — TELEMEDICINE (OUTPATIENT)
Dept: MEDICAL GROUP | Facility: MEDICAL CENTER | Age: 74
End: 2024-01-11
Payer: MEDICARE

## 2024-01-11 VITALS — WEIGHT: 131 LBS | BODY MASS INDEX: 19.85 KG/M2 | HEIGHT: 68 IN

## 2024-01-11 DIAGNOSIS — G89.29 CHRONIC LEFT-SIDED LOW BACK PAIN WITHOUT SCIATICA: ICD-10-CM

## 2024-01-11 DIAGNOSIS — M54.50 CHRONIC LEFT-SIDED LOW BACK PAIN WITHOUT SCIATICA: ICD-10-CM

## 2024-01-11 PROCEDURE — 99214 OFFICE O/P EST MOD 30 MIN: CPT | Mod: 95 | Performed by: FAMILY MEDICINE

## 2024-01-11 RX ORDER — CYCLOBENZAPRINE HCL 5 MG
5-10 TABLET ORAL 3 TIMES DAILY PRN
Qty: 30 TABLET | Refills: 2 | Status: SHIPPED | OUTPATIENT
Start: 2024-01-11

## 2024-01-11 RX ORDER — METHYLPREDNISOLONE 4 MG/1
TABLET ORAL
Qty: 21 TABLET | Refills: 0 | Status: SHIPPED | OUTPATIENT
Start: 2024-01-11

## 2024-01-11 ASSESSMENT — ENCOUNTER SYMPTOMS
BACK PAIN: 1
DIZZINESS: 1

## 2024-01-11 NOTE — PROGRESS NOTES
Telemedicine Visit: Established Patient     This evaluation was conducted via Zoom using secure and encrypted videoconferencing technology. The patient was in their home in the [facility].    The patient's identity was confirmed and verbal consent was obtained for this virtual visit.     Patient was switched over to a virtual visit with her permission, this was due to hazardous driving conditions.    Subjective:     Chief Complaint   Patient presents with    Back Pain     Lower back pain, just started last night        Shabana Bailon is a 73 y.o. female presenting for evaluation and management of:    HPI:  Problem   Low Back Pain    Last night developed severe back pain more to her left lower back then right lower back.  Was having some back discomfort that developed a few months prior after traveling to [place] to help care for her daughter's twins.  She did not experience any back pain that was super severe until last night.  Took a Meloxicam, but didn't help.  Tried ice and hot shower, but her back is still really bad.  Denies any sciatic pain, numbness or tingling, saddle Ramsay  Prior to her back pain starting she was sleeping in different beds, not getting good sleep.  Shoveled snow a few days ago.  Worked out with pilates and spinning yesterday.  About 8pm she some CBD gummies to help with her sleep.  When she went to get something to eat she started to feel dizzy and went to lay down on the floor.  Unsure if she passed out or if this was related to the CBD Gummies.  Went back to bed and a few hours later started with her back pain.  Has never felt anything like this before.  Denies any sciatic pain.  Feels like her lower back is spasming, but the pain doesn't ever go away.  Pain with any movement at all.          Review of Systems   Musculoskeletal:  Positive for back pain (Denies sciatica).   Neurological:  Positive for dizziness (Resolved).        Allergies   Allergen Reactions    Codeine Vomiting     Penicillins Hives and Rash       Current medicines (including changes today)  Current Outpatient Medications   Medication Sig Dispense Refill    cyclobenzaprine (FLEXERIL) 5 mg tablet Take 1-2 Tablets by mouth 3 times a day as needed for Muscle Spasms or Moderate Pain. 30 Tablet 2    methylPREDNISolone (MEDROL DOSEPAK) 4 MG Tablet Therapy Pack As directed on the packaging label. 21 Tablet 0    Estradiol (ESTROGEL) 0.75 MG/1.25 GM (0.06%) Gel EstroGel 1.25 gram/actuation (0.06%) transdermal gel pump      CALCIUM CARBONATE-VIT D-MIN PO Take  by mouth.      NORETHINDRONE ACETATE PO Take  by mouth every 3 months.      Turmeric (QC TUMERIC COMPLEX PO) Take  by mouth.      Multiple Vitamins-Minerals (OCUVITE-LUTEIN) Tab Take 1 Tablet by mouth every day.      B Complex Vitamins (VITAMIN B COMPLEX PO) Take  by mouth.      metformin (GLUCOPHAGE) 1000 MG tablet Take 500 mg by mouth 2 times a day.      meloxicam (MOBIC) 15 MG tablet Take 15 mg by mouth as needed.      buPROPion HCl (WELLBUTRIN PO) Take 250 mg by mouth every day.      valACYclovir (VALTREX) 500 MG Tab Take 500 mg by mouth as needed.       No current facility-administered medications for this visit.       Patient Active Problem List    Diagnosis Date Noted    Low back pain 01/11/2024    Encounter for annual wellness exam in Medicare patient 10/23/2023    Encounter for well woman exam with routine gynecological exam 10/17/2022    On postmenopausal hormone replacement therapy 09/20/2021    High grade squamous intraepithelial lesion (HGSIL), grade 3 KIKI, on biopsy of cervix 09/20/2021    Anxiety and depression 09/20/2021    Eosinophil count raised 09/20/2021    Primary hyperparathyroidism (HCC) 09/26/2011       Family History   Problem Relation Age of Onset    Hypertension Mother     Asthma Mother     Stroke Father     No Known Problems Brother     No Known Problems Brother        She  has a past medical history of Allergy, Depression, History of benign parathyroid  "tumor (9/20/2021), History of Helicobacter pylori infection (9/20/2021), History of loop electrical excision procedure (LEEP) (9/20/2021), and Parathyroid disorder (HCC).    She has no past medical history of Hyperlipidemia or Hypertension.  She  has a past surgical history that includes leep; other; and nasal polypectomy.       Objective:   Ht 1.727 m (5' 8\")   Wt 59.4 kg (131 lb)   BMI 19.92 kg/m²     Physical Exam:  Constitutional: Alert, no distress, well-groomed.  Skin: No rashes in visible areas.  Eye: Round. Conjunctiva clear, lids normal. No icterus.   ENMT: Lips pink without lesions, good dentition, moist mucous membranes. Phonation normal.  Neck: No masses, no thyromegaly. Moves freely without pain.  CV: Pulse as reported by patient  Respiratory: Unlabored respiratory effort, no cough or audible wheeze  Psych: Alert and oriented x3, normal affect and mood.       Assessment and Plan:   The following treatment plan was discussed:        Problem List Items Addressed This Visit       Low back pain     Chronic, unstable.  Continue with daily meloxicam 15 mg daily.  Try Medrol Dosepak to decrease inflammation.  Prescription for Flexeril 5 to 10 mg sent to her preferred pharmacy.  Provided with instruction and education regarding this medication that this may increase her fall risk.  This may also induce drowsiness and avoid any alcoholic beverages or operating heavy machinery while on this medication.  Can use Tylenol for breakthrough pain.  Try heat, IcyHot, gentle stretches and exercises, massage.  Will check a lumbar x-ray for any slipped disc or arthritic changes.  Discussed that this can take anywhere from 4 to 6 weeks for heel time to any injury to the back.  Negative for any red flags, this is limited due to virtual appointment.         Relevant Medications    cyclobenzaprine (FLEXERIL) 5 mg tablet    methylPREDNISolone (MEDROL DOSEPAK) 4 MG Tablet Therapy Pack    Other Relevant Orders    DX-LUMBAR " SPINE-2 OR 3 VIEWS        Follow-up: Return if symptoms worsen or fail to improve.    Please note that this dictation was created using voice recognition software. I have made every reasonable attempt to correct obvious errors, but I expect that there are errors of grammar and possibly content that I did not discover before finalizing the note.

## 2024-01-11 NOTE — ASSESSMENT & PLAN NOTE
Chronic, unstable.  Continue with daily meloxicam 15 mg daily.  Try Medrol Dosepak to decrease inflammation.  Prescription for Flexeril 5 to 10 mg sent to her preferred pharmacy.  Provided with instruction and education regarding this medication that this may increase her fall risk.  This may also induce drowsiness and avoid any alcoholic beverages or operating heavy machinery while on this medication.  Can use Tylenol for breakthrough pain.  Try heat, IcyHot, gentle stretches and exercises, massage.  Will check a lumbar x-ray for any slipped disc or arthritic changes.  Discussed that this can take anywhere from 4 to 6 weeks for heel time to any injury to the back.  Negative for any red flags, this is limited due to virtual appointment.

## 2024-01-12 ENCOUNTER — APPOINTMENT (OUTPATIENT)
Dept: RADIOLOGY | Facility: MEDICAL CENTER | Age: 74
End: 2024-01-12
Attending: FAMILY MEDICINE
Payer: MEDICARE

## 2024-01-12 DIAGNOSIS — M54.50 CHRONIC LEFT-SIDED LOW BACK PAIN WITHOUT SCIATICA: ICD-10-CM

## 2024-01-12 DIAGNOSIS — G89.29 CHRONIC LEFT-SIDED LOW BACK PAIN WITHOUT SCIATICA: ICD-10-CM

## 2024-01-12 PROCEDURE — 72100 X-RAY EXAM L-S SPINE 2/3 VWS: CPT

## 2024-10-25 SDOH — HEALTH STABILITY: PHYSICAL HEALTH: ON AVERAGE, HOW MANY MINUTES DO YOU ENGAGE IN EXERCISE AT THIS LEVEL?: 60 MIN

## 2024-10-25 SDOH — ECONOMIC STABILITY: FOOD INSECURITY: WITHIN THE PAST 12 MONTHS, THE FOOD YOU BOUGHT JUST DIDN'T LAST AND YOU DIDN'T HAVE MONEY TO GET MORE.: NEVER TRUE

## 2024-10-25 SDOH — ECONOMIC STABILITY: INCOME INSECURITY: HOW HARD IS IT FOR YOU TO PAY FOR THE VERY BASICS LIKE FOOD, HOUSING, MEDICAL CARE, AND HEATING?: NOT HARD AT ALL

## 2024-10-25 SDOH — HEALTH STABILITY: PHYSICAL HEALTH: ON AVERAGE, HOW MANY DAYS PER WEEK DO YOU ENGAGE IN MODERATE TO STRENUOUS EXERCISE (LIKE A BRISK WALK)?: 7 DAYS

## 2024-10-25 SDOH — ECONOMIC STABILITY: FOOD INSECURITY: WITHIN THE PAST 12 MONTHS, YOU WORRIED THAT YOUR FOOD WOULD RUN OUT BEFORE YOU GOT MONEY TO BUY MORE.: NEVER TRUE

## 2024-10-25 SDOH — ECONOMIC STABILITY: INCOME INSECURITY: IN THE LAST 12 MONTHS, WAS THERE A TIME WHEN YOU WERE NOT ABLE TO PAY THE MORTGAGE OR RENT ON TIME?: NO

## 2024-10-25 ASSESSMENT — SOCIAL DETERMINANTS OF HEALTH (SDOH)
IN A TYPICAL WEEK, HOW MANY TIMES DO YOU TALK ON THE PHONE WITH FAMILY, FRIENDS, OR NEIGHBORS?: MORE THAN THREE TIMES A WEEK
HOW OFTEN DO YOU ATTENT MEETINGS OF THE CLUB OR ORGANIZATION YOU BELONG TO?: MORE THAN 4 TIMES PER YEAR
HOW HARD IS IT FOR YOU TO PAY FOR THE VERY BASICS LIKE FOOD, HOUSING, MEDICAL CARE, AND HEATING?: NOT HARD AT ALL
HOW OFTEN DO YOU ATTENT MEETINGS OF THE CLUB OR ORGANIZATION YOU BELONG TO?: MORE THAN 4 TIMES PER YEAR
DO YOU BELONG TO ANY CLUBS OR ORGANIZATIONS SUCH AS CHURCH GROUPS UNIONS, FRATERNAL OR ATHLETIC GROUPS, OR SCHOOL GROUPS?: YES
IN A TYPICAL WEEK, HOW MANY TIMES DO YOU TALK ON THE PHONE WITH FAMILY, FRIENDS, OR NEIGHBORS?: MORE THAN THREE TIMES A WEEK
HOW OFTEN DO YOU GET TOGETHER WITH FRIENDS OR RELATIVES?: MORE THAN THREE TIMES A WEEK
HOW OFTEN DO YOU HAVE SIX OR MORE DRINKS ON ONE OCCASION: NEVER
HOW OFTEN DO YOU ATTEND CHURCH OR RELIGIOUS SERVICES?: 1 TO 4 TIMES PER YEAR
HOW OFTEN DO YOU HAVE A DRINK CONTAINING ALCOHOL: 4 OR MORE TIMES A WEEK
HOW OFTEN DO YOU GET TOGETHER WITH FRIENDS OR RELATIVES?: MORE THAN THREE TIMES A WEEK
DO YOU BELONG TO ANY CLUBS OR ORGANIZATIONS SUCH AS CHURCH GROUPS UNIONS, FRATERNAL OR ATHLETIC GROUPS, OR SCHOOL GROUPS?: YES
IN THE PAST 12 MONTHS, HAS THE ELECTRIC, GAS, OIL, OR WATER COMPANY THREATENED TO SHUT OFF SERVICE IN YOUR HOME?: NO
HOW MANY DRINKS CONTAINING ALCOHOL DO YOU HAVE ON A TYPICAL DAY WHEN YOU ARE DRINKING: 1 OR 2
WITHIN THE PAST 12 MONTHS, YOU WORRIED THAT YOUR FOOD WOULD RUN OUT BEFORE YOU GOT THE MONEY TO BUY MORE: NEVER TRUE
HOW OFTEN DO YOU ATTEND CHURCH OR RELIGIOUS SERVICES?: 1 TO 4 TIMES PER YEAR

## 2024-10-25 ASSESSMENT — LIFESTYLE VARIABLES
HOW MANY STANDARD DRINKS CONTAINING ALCOHOL DO YOU HAVE ON A TYPICAL DAY: 1 OR 2
HOW OFTEN DO YOU HAVE A DRINK CONTAINING ALCOHOL: 4 OR MORE TIMES A WEEK
AUDIT-C TOTAL SCORE: 4
HOW OFTEN DO YOU HAVE SIX OR MORE DRINKS ON ONE OCCASION: NEVER
SKIP TO QUESTIONS 9-10: 1

## 2024-10-28 ENCOUNTER — APPOINTMENT (OUTPATIENT)
Dept: MEDICAL GROUP | Facility: MEDICAL CENTER | Age: 74
End: 2024-10-28
Payer: MEDICARE

## 2024-11-26 ENCOUNTER — HOSPITAL ENCOUNTER (OUTPATIENT)
Facility: MEDICAL CENTER | Age: 74
End: 2024-11-26
Attending: FAMILY MEDICINE
Payer: MEDICARE

## 2024-11-26 ENCOUNTER — OFFICE VISIT (OUTPATIENT)
Dept: MEDICAL GROUP | Facility: MEDICAL CENTER | Age: 74
End: 2024-11-26
Payer: MEDICARE

## 2024-11-26 ENCOUNTER — HOSPITAL ENCOUNTER (OUTPATIENT)
Dept: LAB | Facility: MEDICAL CENTER | Age: 74
End: 2024-11-26
Attending: FAMILY MEDICINE
Payer: MEDICARE

## 2024-11-26 VITALS
BODY MASS INDEX: 21.67 KG/M2 | OXYGEN SATURATION: 96 % | TEMPERATURE: 97.8 F | HEART RATE: 79 BPM | HEIGHT: 68 IN | SYSTOLIC BLOOD PRESSURE: 162 MMHG | DIASTOLIC BLOOD PRESSURE: 92 MMHG | WEIGHT: 143 LBS

## 2024-11-26 DIAGNOSIS — Z01.419 WOMEN'S ANNUAL ROUTINE GYNECOLOGICAL EXAMINATION: ICD-10-CM

## 2024-11-26 DIAGNOSIS — E21.0 PRIMARY HYPERPARATHYROIDISM (HCC): ICD-10-CM

## 2024-11-26 DIAGNOSIS — Z13.6 ENCOUNTER FOR LIPID SCREENING FOR CARDIOVASCULAR DISEASE: ICD-10-CM

## 2024-11-26 DIAGNOSIS — Z00.00 ANNUAL WELLNESS VISIT: Primary | ICD-10-CM

## 2024-11-26 DIAGNOSIS — R73.09 ELEVATED HEMOGLOBIN A1C: ICD-10-CM

## 2024-11-26 DIAGNOSIS — D64.9 ANEMIA, UNSPECIFIED TYPE: ICD-10-CM

## 2024-11-26 DIAGNOSIS — M25.552 LEFT HIP PAIN: ICD-10-CM

## 2024-11-26 DIAGNOSIS — Z13.220 ENCOUNTER FOR LIPID SCREENING FOR CARDIOVASCULAR DISEASE: ICD-10-CM

## 2024-11-26 PROBLEM — L57.0 ACTINIC KERATOSIS: Status: RESOLVED | Noted: 2024-11-26 | Resolved: 2024-11-26

## 2024-11-26 PROBLEM — L40.9 PSORIASIS: Status: ACTIVE | Noted: 2024-11-26

## 2024-11-26 PROBLEM — D22.9 MELANOCYTIC NEVUS: Status: RESOLVED | Noted: 2024-11-26 | Resolved: 2024-11-26

## 2024-11-26 PROBLEM — D48.5 NEOPLASM OF UNCERTAIN BEHAVIOR OF SKIN: Status: RESOLVED | Noted: 2024-04-24 | Resolved: 2024-11-26

## 2024-11-26 PROCEDURE — 88142 CYTOPATH C/V THIN LAYER: CPT

## 2024-11-26 ASSESSMENT — PATIENT HEALTH QUESTIONNAIRE - PHQ9: CLINICAL INTERPRETATION OF PHQ2 SCORE: 0

## 2024-11-26 ASSESSMENT — ACTIVITIES OF DAILY LIVING (ADL): BATHING_REQUIRES_ASSISTANCE: 0

## 2024-11-26 ASSESSMENT — ENCOUNTER SYMPTOMS: GENERAL WELL-BEING: EXCELLENT

## 2024-11-26 NOTE — PROGRESS NOTES
Chief Complaint   Patient presents with    Annual Exam       HPI:  Shabana Bailon is a 74 y.o. here for Medicare Annual Wellness Visit     History of Present Illness  The patient is a 74-year-old female who is here for her annual wellness exam.    She was informed of low hemoglobin levels during a recent blood donation attempt. Her last comprehensive lab work was conducted approximately 2 years ago.    She has been experiencing pain in her left hip for about a month, which intensifies when walking or climbing stairs. Interestingly, she did not experience any discomfort during a recent skiing trip. The pain is not present today, possibly due to a lack of physical activity. The pain is located on the lateral side of her hip and does not worsen with pressure. She has been managing the pain with Meloxicam. She also practices Pilates on the reformer five times a week, which does not exacerbate the pain. Despite the pain, she maintains an active lifestyle.    She has a history of high blood pressure, which was first noted a few years ago during an examination. She monitored her blood pressure at home for a period but has not done so in the past year. She reports no headaches or dizziness.    She has a history of KIKI-3 of the cervix.    She had a mammogram in 04/2024 in [place] and a bone density test in 2022.    SOCIAL HISTORY  She retired in 12/2023.        Patient Active Problem List    Diagnosis Date Noted    Psoriasis 11/26/2024    Low back pain 01/11/2024    Encounter for annual wellness exam in Medicare patient 10/23/2023    Encounter for well woman exam with routine gynecological exam 10/17/2022    On postmenopausal hormone replacement therapy 09/20/2021    High grade squamous intraepithelial lesion (HGSIL), grade 3 KIKI, on biopsy of cervix 09/20/2021    Anxiety and depression 09/20/2021    Eosinophil count raised 09/20/2021    Primary hyperparathyroidism (HCC) 09/26/2011       Current Outpatient Medications    Medication Sig Dispense Refill    Estradiol (ESTROGEL) 0.75 MG/1.25 GM (0.06%) Gel EstroGel 1.25 gram/actuation (0.06%) transdermal gel pump      CALCIUM CARBONATE-VIT D-MIN PO Take  by mouth.      Turmeric (QC TUMERIC COMPLEX PO) Take  by mouth.      Multiple Vitamins-Minerals (OCUVITE-LUTEIN) Tab Take 1 Tablet by mouth every day.      B Complex Vitamins (VITAMIN B COMPLEX PO) Take  by mouth.      metformin (GLUCOPHAGE) 1000 MG tablet Take 500 mg by mouth 3 times a day.      meloxicam (MOBIC) 15 MG tablet Take 15 mg by mouth as needed.      buPROPion HCl (WELLBUTRIN PO) Take 300 mg by mouth every day.      NORETHINDRONE ACETATE PO Take  by mouth every 3 months.      valACYclovir (VALTREX) 500 MG Tab Take 500 mg by mouth as needed.       No current facility-administered medications for this visit.          Current supplements as per medication list.     Allergies: Codeine and Penicillins    Current social contact/activities: Traveling, pilaletes.     She  reports that she has never smoked. She has never used smokeless tobacco. She reports current alcohol use of about 4.2 oz of alcohol per week. She reports that she does not use drugs.  Counseling given: Not Answered      ROS:    Gait: Uses no assistive device  Ostomy: No  Other tubes: No  Amputations: No  Chronic oxygen use: No  Last eye exam: a month ago  Wears hearing aids: No   : Denies any urinary leakage during the last 6 months    Screening:    Depression Screening  Little interest or pleasure in doing things?  0 - not at all  Feeling down, depressed , or hopeless? 0 - not at all  Patient Health Questionnaire Score: 0     If depressive symptoms identified deferred to follow up visit unless specifically addressed in assessment and plan.    Interpretation of PHQ-9 Total Score   Score Severity   1-4 No Depression   5-9 Mild Depression   10-14 Moderate Depression   15-19 Moderately Severe Depression   20-27 Severe Depression    Screening for Cognitive  Impairment  Do you or any of your friends or family members have any concern about your memory? No  Three Minute Recall (Leader, Season, Table) 2/3    Robert clock face with all 12 numbers and set the hands to show 10 minutes after 11.  Yes    Cognitive concerns identified deferred for follow up unless specifically addressed in assessment and plan.    Fall Risk Assessment  Has the patient had two or more falls in the last year or any fall with injury in the last year?  No    Safety Assessment  Do you always wear your seatbelt?  Yes  Any changes to home needed to function safely? No  Difficulty hearing.  Yes  Patient counseled about all safety risks that were identified.    Functional Assessment ADLs  Are there any barriers preventing you from cooking for yourself or meeting nutritional needs?  No.    Are there any barriers preventing you from driving safely or obtaining transportation?  No.    Are there any barriers preventing you from using a telephone or calling for help?  No    Are there any barriers preventing you from shopping?  No.    Are there any barriers preventing you from taking care of your own finances?  No    Are there any barriers preventing you from managing your medications?  No    Are there any barriers preventing you from showering, bathing or dressing yourself? No    Are there any barriers preventing you from doing housework or laundry? No  Are there any barriers preventing you from using the toilet?No  Are you currently engaging in any exercise or physical activity?  Yes.      Self-Assessment of Health  What is your perception of your health? Excellent    Do you sleep more than six hours a night? Yes    In the past 7 days, how much did pain keep you from doing your normal work? A lot    Do you spend quality time with family or friends (virtually or in person)? Yes    Do you usually eat a heart healthy diet that constists of a variety of fruits, vegetables, whole grains and fiber? Yes    Do you eat  foods high in fat and/or Fast Food more than three times per week? No    How concerned are you that your medical conditions are not being well managed? Not at all    Are you worried that in the next 2 months, you may not have stable housing that you own, rent, or stay in as part of a household? No      Advance Care Planning  Do you have an Advance Directive, Living Will, Durable Power of , or POLST? Yes                 Health Maintenance Summary            Overdue - COVID-19 Vaccine (3 - 2024-25 season) Overdue since 9/1/2024 11/01/2021  Imm Admin: Marge SARS-CoV-2 Vaccine    03/11/2021  Imm Admin: Marge SARS-CoV-2 Vaccine              Overdue - Annual Wellness Visit (Yearly) Overdue since 10/23/2024      10/23/2023  Subsequent Annual Wellness Visit - Includes PPPS ()    10/23/2023  Level of Service: ANNUAL WELLNESS VISIT-INCLUDES PPPS SUBSEQUE*    10/17/2022  Level of Service: MI PREVENTIVE VISIT,NEW,65 & OVER              Mammogram (Yearly) Next due on 4/26/2025 04/26/2024  MA-SCREENING MAMMO BILAT W/TOMOSYNTHESIS W/CAD    04/14/2023  MA-SCREENING MAMMO BILAT W/TOMOSYNTHESIS W/CAD    01/07/2022  MA-DIAGNOSTIC MAMMO LEFT W/O CAD    01/05/2022  MA-SCREENING MAMMO BILAT W/TOMOSYNTHESIS W/CAD    11/19/2020  Done    Only the first 5 history entries have been loaded, but more history exists.              Colorectal Cancer Screening (Colon Cancer Screening Cologuard Stool (FIT DNA) - Every 3 Years) Tentatively due on 8/14/2026 08/14/2023  COLOGUARD RESULT component of COLOGUARD COLON CANCER SCREENING    08/03/2020  Noninvasive Colorectal Cancer DNA and Occult Blood Screening component of COLOGUARD COLON CANCER SCREENING              Bone Density Scan (Every 5 Years) Tentatively due on 2/1/2027 02/01/2022  DS-BONE DENSITY STUDY (DEXA)              IMM DTaP/Tdap/Td Vaccine (2 - Td or Tdap) Next due on 12/23/2031 12/23/2021  Imm Admin: Tdap Vaccine              Hepatitis C  Screening  Tentatively Complete      05/24/2019  Outside Procedure: HEP C VIRUS ANTIBODY              Pneumococcal Vaccine: 65+ Years (Series Information) Completed      07/20/2020  Imm Admin: Pneumococcal polysaccharide vaccine (PPSV-23)    07/02/2019  Imm Admin: Pneumococcal Conjugate Vaccine (Prevnar/PCV-13)              Influenza Vaccine (Series Information) Completed      08/20/2024  Imm Admin: Influenza high-dose trivalent (PF)    09/13/2023  Imm Admin: Influenza Vaccine Adult HD    09/26/2022  Imm Admin: Influenza Vaccine Adult HD    10/15/2021  Imm Admin: Influenza Vaccine Adult HD    10/01/2018  Imm Admin: Influenza Vaccine Quad Inj (Pf)    Only the first 5 history entries have been loaded, but more history exists.              Hepatitis A Vaccine (Hep A) (Series Information) Aged Out      No completion history exists for this topic.              Hepatitis B Vaccine (Hep B) (Series Information) Aged Out      No completion history exists for this topic.              HPV Vaccines (Series Information) Aged Out      No completion history exists for this topic.              Polio Vaccine (Inactivated Polio) (Series Information) Aged Out      No completion history exists for this topic.              Meningococcal Immunization (Series Information) Aged Out      No completion history exists for this topic.              Discontinued - Cervical Cancer Screening  Ordered on 11/26/2024        Frequency changed to Never automatically (Topic No Longer Applies)    10/23/2023  THINPREP PAP WITH HPV    10/17/2022  THINPREP PAP WITH HPV    10/17/2022  Pathology Gynecology Specimen    09/20/2021  THINPREP PAP WITH HPV    Only the first 5 history entries have been loaded, but more history exists.              Discontinued - Zoster (Shingles) Vaccines  Discontinued      11/07/2016  Imm Admin: Zoster Vaccine Live (ZVL) (Zostavax) - HISTORICAL DATA                    Patient Care Team:  CHARLES Yu as PCP - General  "(Family Medicine)        Social History     Tobacco Use    Smoking status: Never    Smokeless tobacco: Never   Vaping Use    Vaping status: Never Used   Substance Use Topics    Alcohol use: Yes     Alcohol/week: 4.2 oz     Types: 6 Glasses of wine, 1 Cans of beer per week    Drug use: Never     Family History   Problem Relation Age of Onset    Hypertension Mother     Asthma Mother     Lung Disease Mother     Stroke Father     No Known Problems Brother     No Known Problems Brother      She  has a past medical history of Allergy, Depression, 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    She has no past medical history of Hyperlipidemia or Hypertension.   Past Surgical History:   Procedure Laterality Date    LEEP      NASAL POLYPECTOMY      OTHER      parathyroid surgery       Exam:   BP (!) 162/92   Pulse 79   Temp 36.6 °C (97.8 °F) (Temporal)   Ht 1.727 m (5' 8\")   Wt 64.9 kg (143 lb)   SpO2 96%  Body mass index is 21.74 kg/m².    Hearing excellent.    Dentition good  Alert, oriented in no acute distress.  Eye contact is good, speech goal directed, affect calm    Physical Exam  Constitutional:       General: She is not in acute distress.  HENT:      Head: Normocephalic and atraumatic.      Right Ear: Tympanic membrane and external ear normal.      Left Ear: Tympanic membrane and external ear normal.      Nose: No nasal deformity.      Mouth/Throat:      Lips: Pink.      Mouth: Mucous membranes are moist.      Pharynx: Oropharynx is clear. Uvula midline. No posterior oropharyngeal erythema.   Eyes:      General: Lids are normal.      Extraocular Movements: Extraocular movements intact.      Conjunctiva/sclera: Conjunctivae normal.      Pupils: Pupils are equal, round, " and reactive to light.   Neck:      Trachea: Trachea normal.   Cardiovascular:      Rate and Rhythm: Normal rate and regular rhythm.      Heart sounds: Normal heart sounds. No murmur heard.     No friction rub. No gallop.   Pulmonary:      Effort: Pulmonary effort is normal. No accessory muscle usage.      Breath sounds: Normal breath sounds. No wheezing or rales.   Abdominal:      General: Bowel sounds are normal.      Palpations: Abdomen is soft.      Tenderness: There is no abdominal tenderness.   Genitourinary:     Labia:         Right: No rash, tenderness or lesion.         Left: No rash, tenderness or lesion.       Vagina: No vaginal discharge, tenderness or lesions.      Cervix: No friability, erythema or eversion.      Uterus: Normal.       Adnexa: Right adnexa normal and left adnexa normal.   Musculoskeletal:      Cervical back: Normal range of motion and neck supple.      Right lower leg: No edema.      Left lower leg: No edema.   Lymphadenopathy:      Cervical: No cervical adenopathy.   Skin:     General: Skin is warm and dry.      Findings: No rash.   Neurological:      General: No focal deficit present.      Mental Status: She is alert and oriented to person, place, and time. Mental status is at baseline.      GCS: GCS eye subscore is 4. GCS verbal subscore is 5. GCS motor subscore is 6.      Motor: No weakness.      Gait: Gait is intact.   Psychiatric:         Attention and Perception: Attention normal.         Mood and Affect: Mood and affect normal.         Speech: Speech normal.         Assessment and Plan. The following treatment and monitoring plan is recommended:   Assessment & Plan  1. Annual wellness visit.  Services suggested: No services needed at this time  Health Care Screening: Age-appropriate preventive services recommended by USPTF and ACIP covered by Medicare were discussed today. Services ordered if indicated and agreed upon by the patient.  Referrals offered: Community-based lifestyle  interventions to reduce health risks and promote self-management and wellness, fall prevention, nutrition, physical activity, tobacco-use cessation, weight loss, and mental health services as per orders if indicated.    Discussion today about general wellness and lifestyle habits:    Prevent falls and reduce trip hazards; Cautioned about securing or removing rugs.  Have a working fire alarm and carbon monoxide detector;   Engage in regular physical activity and social activities     2. Possible anemia.  Acute  She normally donates blood but was recently told her hemoglobin was low. A CBC, iron studies, ferritin levels, folic acid, and B12 levels have been ordered to determine if she is truly anemic.    3. Left hip pain.  Acute  She reports left hip pain for about a month, which worsens with walking or climbing stairs but not with lifting the leg. There is no increase in tenderness upon palpation, only with muscle activation. It is likely musculoskeletal rather than an issue with the hip joint. Gentle stretches and exercises have been recommended. She should continue with Tylenol and ibuprofen. A left hip x-ray has been ordered at her request.    4. Elevated blood pressure.  Subacute  Her blood pressure was recorded at 160/90 during the visit. She does not usually have high blood pressure and has not checked it at home in the last year. She is advised to monitor her blood pressure at home, especially in the evening when it is generally highest. If it remains elevated, medication may be started.    5. Health Maintenance.  A Pap smear was performed due to her history of carcinoma in situ and KIKI-3 of the cervix. Non-fasting cholesterol labs have been ordered. She is advised to get her mammogram annually despite the USPS TF recommendation changes.      Shabana was seen today for annual exam.    Diagnoses and all orders for this visit:    Annual wellness visit    Anemia, unspecified type  -     CBC WITHOUT DIFFERENTIAL;  Future  -     IRON/TOTAL IRON BIND; Future  -     FERRITIN; Future  -     VITAMIN B12; Future  -     FOLATE; Future    Left hip pain  -     DX-HIP-UNILATERAL-WITH PELVIS-1 VIEW LEFT; Future    Elevated hemoglobin A1c  -     CBC WITHOUT DIFFERENTIAL; Future  -     Comp Metabolic Panel; Future  -     ESTIMATED GFR; Future  -     HEMOGLOBIN A1C; Future    Primary hyperparathyroidism (HCC)  -     TSH+FREE T4    Encounter for lipid screening for cardiovascular disease  -     Lipid Profile; Future  -     Lipoprotein (a); Future    Women's annual routine gynecological examination  -     THINPREP PAP WITH HPV; Future        Follow-up: Return in about 1 year (around 11/26/2025) for Annual and pap.    Educated on annual wellness visit information that is covered by their insurance during these exams.  Patient informed that they may receive an additional charge for any acute complaints that are brought up during the annual wellness visit.  Patient acknowledges advisement.     please note that this dictation was created using voice recognition software. I have made every reasonable attempt to correct obvious errors, but I expect that there are errors of grammar and possibly content that I did not discover before finalizing the note.

## 2024-12-04 ENCOUNTER — PATIENT MESSAGE (OUTPATIENT)
Dept: MEDICAL GROUP | Facility: MEDICAL CENTER | Age: 74
End: 2024-12-04
Payer: MEDICARE

## 2024-12-04 ENCOUNTER — HOSPITAL ENCOUNTER (OUTPATIENT)
Dept: RADIOLOGY | Facility: MEDICAL CENTER | Age: 74
End: 2024-12-04
Attending: FAMILY MEDICINE
Payer: MEDICARE

## 2024-12-04 ENCOUNTER — HOSPITAL ENCOUNTER (OUTPATIENT)
Dept: LAB | Facility: MEDICAL CENTER | Age: 74
End: 2024-12-04
Attending: FAMILY MEDICINE
Payer: MEDICARE

## 2024-12-04 DIAGNOSIS — R73.09 ELEVATED HEMOGLOBIN A1C: ICD-10-CM

## 2024-12-04 DIAGNOSIS — Z13.6 ENCOUNTER FOR LIPID SCREENING FOR CARDIOVASCULAR DISEASE: ICD-10-CM

## 2024-12-04 DIAGNOSIS — M25.552 LEFT HIP PAIN: ICD-10-CM

## 2024-12-04 DIAGNOSIS — Z13.220 ENCOUNTER FOR LIPID SCREENING FOR CARDIOVASCULAR DISEASE: ICD-10-CM

## 2024-12-04 DIAGNOSIS — D64.9 ANEMIA, UNSPECIFIED TYPE: ICD-10-CM

## 2024-12-04 LAB
ALBUMIN SERPL BCP-MCNC: 4.4 G/DL (ref 3.2–4.9)
ALBUMIN/GLOB SERPL: 1.6 G/DL
ALP SERPL-CCNC: 81 U/L (ref 30–99)
ALT SERPL-CCNC: 24 U/L (ref 2–50)
ANION GAP SERPL CALC-SCNC: 11 MMOL/L (ref 7–16)
AST SERPL-CCNC: 25 U/L (ref 12–45)
BILIRUB SERPL-MCNC: 0.4 MG/DL (ref 0.1–1.5)
BUN SERPL-MCNC: 19 MG/DL (ref 8–22)
CALCIUM ALBUM COR SERPL-MCNC: 9.5 MG/DL (ref 8.5–10.5)
CALCIUM SERPL-MCNC: 9.8 MG/DL (ref 8.4–10.2)
CHLORIDE SERPL-SCNC: 101 MMOL/L (ref 96–112)
CHOLEST SERPL-MCNC: 158 MG/DL (ref 100–199)
CO2 SERPL-SCNC: 28 MMOL/L (ref 20–33)
CREAT SERPL-MCNC: 0.86 MG/DL (ref 0.5–1.4)
ERYTHROCYTE [DISTWIDTH] IN BLOOD BY AUTOMATED COUNT: 48 FL (ref 35.9–50)
EST. AVERAGE GLUCOSE BLD GHB EST-MCNC: 117 MG/DL
FERRITIN SERPL-MCNC: 62.9 NG/ML (ref 10–291)
FOLATE SERPL-MCNC: 31 NG/ML
GFR SERPLBLD CREATININE-BSD FMLA CKD-EPI: 71 ML/MIN/1.73 M 2
GLOBULIN SER CALC-MCNC: 2.7 G/DL (ref 1.9–3.5)
GLUCOSE SERPL-MCNC: 99 MG/DL (ref 65–99)
HBA1C MFR BLD: 5.7 % (ref 4–5.6)
HCT VFR BLD AUTO: 44.5 % (ref 37–47)
HDLC SERPL-MCNC: 72 MG/DL
HGB BLD-MCNC: 15 G/DL (ref 12–16)
IRON SATN MFR SERPL: 37 % (ref 15–55)
IRON SERPL-MCNC: 126 UG/DL (ref 40–170)
LDLC SERPL CALC-MCNC: 76 MG/DL
MCH RBC QN AUTO: 32.5 PG (ref 27–33)
MCHC RBC AUTO-ENTMCNC: 33.7 G/DL (ref 32.2–35.5)
MCV RBC AUTO: 96.5 FL (ref 81.4–97.8)
PLATELET # BLD AUTO: 403 K/UL (ref 164–446)
PMV BLD AUTO: 9.8 FL (ref 9–12.9)
POTASSIUM SERPL-SCNC: 4.3 MMOL/L (ref 3.6–5.5)
PROT SERPL-MCNC: 7.1 G/DL (ref 6–8.2)
RBC # BLD AUTO: 4.61 M/UL (ref 4.2–5.4)
SODIUM SERPL-SCNC: 140 MMOL/L (ref 135–145)
T4 FREE SERPL-MCNC: 1.44 NG/DL (ref 0.93–1.7)
TIBC SERPL-MCNC: 338 UG/DL (ref 250–450)
TRIGL SERPL-MCNC: 49 MG/DL (ref 0–149)
TSH SERPL DL<=0.005 MIU/L-ACNC: 2.15 UIU/ML (ref 0.38–5.33)
UIBC SERPL-MCNC: 212 UG/DL (ref 110–370)
VIT B12 SERPL-MCNC: 790 PG/ML (ref 211–911)
WBC # BLD AUTO: 8.6 K/UL (ref 4.8–10.8)

## 2024-12-04 PROCEDURE — 82728 ASSAY OF FERRITIN: CPT

## 2024-12-04 PROCEDURE — 83540 ASSAY OF IRON: CPT

## 2024-12-04 PROCEDURE — 83036 HEMOGLOBIN GLYCOSYLATED A1C: CPT | Mod: GA

## 2024-12-04 PROCEDURE — 83550 IRON BINDING TEST: CPT

## 2024-12-04 PROCEDURE — 84439 ASSAY OF FREE THYROXINE: CPT

## 2024-12-04 PROCEDURE — 84443 ASSAY THYROID STIM HORMONE: CPT

## 2024-12-04 PROCEDURE — 80053 COMPREHEN METABOLIC PANEL: CPT

## 2024-12-04 PROCEDURE — 85027 COMPLETE CBC AUTOMATED: CPT

## 2024-12-04 PROCEDURE — 82607 VITAMIN B-12: CPT

## 2024-12-04 PROCEDURE — 73501 X-RAY EXAM HIP UNI 1 VIEW: CPT | Mod: LT

## 2024-12-04 PROCEDURE — 36415 COLL VENOUS BLD VENIPUNCTURE: CPT

## 2024-12-04 PROCEDURE — 82746 ASSAY OF FOLIC ACID SERUM: CPT

## 2024-12-04 PROCEDURE — 83695 ASSAY OF LIPOPROTEIN(A): CPT

## 2024-12-04 PROCEDURE — 80061 LIPID PANEL: CPT

## 2024-12-04 RX ORDER — BUPROPION HYDROCHLORIDE 150 MG/1
150 TABLET, EXTENDED RELEASE ORAL 2 TIMES DAILY
COMMUNITY
End: 2024-12-04 | Stop reason: SDUPTHER

## 2024-12-05 ENCOUNTER — PATIENT MESSAGE (OUTPATIENT)
Dept: MEDICAL GROUP | Facility: MEDICAL CENTER | Age: 74
End: 2024-12-05
Payer: MEDICARE

## 2024-12-05 DIAGNOSIS — R03.0 BLOOD PRESSURE INCREASE DIASTOLIC: ICD-10-CM

## 2024-12-05 DIAGNOSIS — M16.12 PRIMARY OSTEOARTHRITIS OF LEFT HIP: ICD-10-CM

## 2024-12-05 LAB
HPV I/H RISK 1 DNA SPEC QL NAA+PROBE: NOT DETECTED
SPECIMEN SOURCE: NORMAL
THINPREP PAP, CYTOLOGY NL11781: NORMAL

## 2024-12-05 RX ORDER — BUPROPION HYDROCHLORIDE 150 MG/1
150 TABLET, EXTENDED RELEASE ORAL 2 TIMES DAILY
Qty: 180 TABLET | Refills: 3 | Status: SHIPPED | OUTPATIENT
Start: 2024-12-05

## 2024-12-05 RX ORDER — AMLODIPINE BESYLATE 5 MG/1
5 TABLET ORAL DAILY
Qty: 90 TABLET | Refills: 3 | Status: SHIPPED | OUTPATIENT
Start: 2024-12-05

## 2024-12-06 LAB — LPA SERPL-MCNC: 14 MG/DL

## 2024-12-24 ENCOUNTER — PATIENT MESSAGE (OUTPATIENT)
Dept: MEDICAL GROUP | Facility: MEDICAL CENTER | Age: 74
End: 2024-12-24
Payer: MEDICARE

## 2025-01-23 ENCOUNTER — PATIENT MESSAGE (OUTPATIENT)
Dept: MEDICAL GROUP | Facility: MEDICAL CENTER | Age: 75
End: 2025-01-23
Payer: MEDICARE

## 2025-01-23 DIAGNOSIS — R03.0 BLOOD PRESSURE INCREASE DIASTOLIC: ICD-10-CM

## 2025-01-23 RX ORDER — AMLODIPINE BESYLATE 5 MG/1
10 TABLET ORAL DAILY
Qty: 90 TABLET | Refills: 3 | Status: SHIPPED | OUTPATIENT
Start: 2025-01-23

## 2025-02-25 ENCOUNTER — PATIENT MESSAGE (OUTPATIENT)
Dept: MEDICAL GROUP | Facility: MEDICAL CENTER | Age: 75
End: 2025-02-25
Payer: MEDICARE

## 2025-02-25 ENCOUNTER — APPOINTMENT (OUTPATIENT)
Dept: PHYSICAL THERAPY | Facility: MEDICAL CENTER | Age: 75
End: 2025-02-25
Attending: FAMILY MEDICINE
Payer: MEDICARE

## 2025-02-25 DIAGNOSIS — R03.0 BLOOD PRESSURE INCREASE DIASTOLIC: ICD-10-CM

## 2025-02-25 RX ORDER — AMLODIPINE BESYLATE 10 MG/1
10 TABLET ORAL DAILY
Qty: 100 TABLET | Refills: 3 | Status: SHIPPED | OUTPATIENT
Start: 2025-02-25

## 2025-02-26 ENCOUNTER — PHYSICAL THERAPY (OUTPATIENT)
Dept: PHYSICAL THERAPY | Facility: MEDICAL CENTER | Age: 75
End: 2025-02-26
Attending: FAMILY MEDICINE
Payer: MEDICARE

## 2025-02-26 ENCOUNTER — PATIENT MESSAGE (OUTPATIENT)
Dept: MEDICAL GROUP | Facility: MEDICAL CENTER | Age: 75
End: 2025-02-26

## 2025-02-26 DIAGNOSIS — M25.552 PAIN OF LEFT HIP: ICD-10-CM

## 2025-02-26 PROCEDURE — 97161 PT EVAL LOW COMPLEX 20 MIN: CPT

## 2025-02-26 PROCEDURE — 97110 THERAPEUTIC EXERCISES: CPT

## 2025-02-26 ASSESSMENT — ENCOUNTER SYMPTOMS
ALLEVIATING FACTORS: POSITION CHANGE
EXACERBATED BY: STAIR CLIMBING
PAIN TIMING: WHEN ACTIVE
QUALITY: ACHING
PAIN SCALE AT LOWEST: 1
PAIN SCALE: 1
QUALITY: DISCOMFORT
PAIN SCALE AT HIGHEST: 6
EXACERBATED BY: WALKING
EXACERBATED BY: JUMPING

## 2025-02-26 NOTE — OP THERAPY EVALUATION
"  Outpatient Physical Therapy  INITIAL EVALUATION    [facility] Outpatient Physical Therapy  [address]  [place] [address]  Phone:  [phone]  Fax:  [phone]    Date of Evaluation: 2025    Patient: Shabana Bailon  YOB: 1950  MRN: [id number]     Referring Provider: CHARLES Yu  [address]  [address]  [place] [address]   Referring Diagnosis Unilateral primary osteoarthritis, left hip [M16.12]     Time Calculation  Start time: 904  Stop time: 945 Time Calculation (min): 41 minutes         Chief Complaint: Hip Problem    Visit Diagnoses     ICD-10-CM   1. Pain of left hip  M25.552       Date of onset of impairment: 10/26/2024    Subjective:   History of Present Illness:     Date of onset:  2024    Mechanism of injury:  Pt reports she has had L hip pain for 4-5 months. She reports a fluctuation in her hip pain that \"comes and goes\" but never completely goes away. She reports it is worse with walking, walking up stairs and sleeping. Skiing does not bother her hip. She reports pain immediately upon walking in the first 3 steps.  Pt reports external rotation stretch positions of the L hip aggravate her hip pain laterally.   Sleep disturbance:  Interrupted sleep  Pain:     Current pain ratin    At best pain ratin    At worst pain ratin    Quality:  Aching and discomfort    Pain timing:  When active    Relieving factors:  Position change    Aggravating factors:  Stair climbing, walking and jumping    Progression:  Improving  Social Support:     Lives in:  Multiple-level home    Lives with:  Significant other  Hand dominance:  Right  Patient Goals:     Patient goals for therapy:  Decreased pain and return to sport/leisure activities    Other patient goals:  Walk with less or without pain.      Past Medical History:   Diagnosis Date   • Allergy    • Depression    • History of benign parathyroid tumor 2021   • " History of Helicobacter pylori infection 09/20/2021   • History of loop electrical excision procedure (LEEP) 09/20/2021   • Melanocytic nevus 11/26/2024   • Neoplasm of uncertain behavior of skin 04/24/2024   • Parathyroid disorder (HCC)      Past Surgical History:   Procedure Laterality Date   • LEEP     • NASAL POLYPECTOMY     • OTHER      parathyroid surgery     Social History     Tobacco Use   • Smoking status: Never   • Smokeless tobacco: Never   Substance Use Topics   • Alcohol use: Yes     Alcohol/week: 4.2 oz     Types: 6 Glasses of wine, 1 Cans of beer per week     Family and Occupational History     Socioeconomic History   • Marital status:      Spouse name: Not on file   • Number of children: Not on file   • Years of education: Not on file   • Highest education level: Professional school degree (e.g., MD, DDS, DVM, MYAH)   Occupational History   • Not on file       Objective     Active Range of Motion   Left Hip   Normal active range of motion    Right Hip   Normal active range of motion    Passive Range of Motion   Left Hip   Normal passive range of motion    Right Hip   Normal passive range of motion    Strength:      Left Hip     Isolated Muscles   Gluteus minimus: 4    Right Hip     Isolated Muscles   Gluteus minimus: 4+        Therapeutic Exercises (CPT 57782):     1. Single leg bridge, x10, HEP    2. Sidelying hip abd, x10, HEP    3. Standing hip abd, x10, HEP    4. Lateral band walk, 3x15 feet, HEP      Therapeutic Exercise Summary: Pt was educated today regarding current condition, expectations for rehab potential and appropriate exercise program for home. HEP was given to patient in print out form and instructions were communicated to pt.       Time-based treatments/modalities:    Physical Therapy Timed Treatment Charges  Therapeutic exercise minutes (CPT 46265): 15 minutes      Assessment, Response and Plan:   Impairments: activity intolerance and pain with function    Assessment details:   Pt is a 75 y/o F presenting to PT with signs and symptoms consistent with L hip pain secondary to lateral hip and gluteal weakness. Pt has deficits of pain, decreased tolerance for walking. Given pt's age, overall health, current condition and adherence to PT recommendations, anticipated duration of episode is 8 weeks.      Prognosis: fair    Goals:   Short Term Goals:   1. Pt will achieve 5/5 lateral hip strength testing with MMT of glute med  2. Pt will report 25% or greater improvement of symptoms    Short term goal time span:  2-4 weeks      Long Term Goals:    1. Pt will be able to walk 1 mile or greater pain free or near pain free (2/10 or lower on numeric pain rating scale)  2. Pt will report 50% or greater improvement of symptoms  Long term goal time span:  6-8 weeks    Plan:   Therapy options:  Physical therapy treatment to continue  Planned therapy interventions:  Neuromuscular Re-education (CPT 27500), Therapeutic Exercise (CPT 31758) and Therapeutic Activities (CPT 29407)  Frequency:  2x week  Duration in weeks:  8  Discussed with:  Patient      Functional Assessment Used  WOMAC Grand Total: 11.46     Referring provider co-signature:  I have reviewed this plan of care and my co-signature certifies the need for services.    Certification Period: 02/26/2025 to  04/27/25    Physician Signature: ________________________________ Date: ______________

## 2025-02-27 ENCOUNTER — PATIENT MESSAGE (OUTPATIENT)
Dept: MEDICAL GROUP | Facility: MEDICAL CENTER | Age: 75
End: 2025-02-27
Payer: MEDICARE

## 2025-02-27 DIAGNOSIS — R73.09 ELEVATED HEMOGLOBIN A1C: ICD-10-CM

## 2025-02-28 PROBLEM — R73.09 ELEVATED HEMOGLOBIN A1C: Status: ACTIVE | Noted: 2025-02-28

## 2025-03-18 ENCOUNTER — PHYSICAL THERAPY (OUTPATIENT)
Dept: PHYSICAL THERAPY | Facility: MEDICAL CENTER | Age: 75
End: 2025-03-18
Attending: FAMILY MEDICINE
Payer: MEDICARE

## 2025-03-18 DIAGNOSIS — M25.552 PAIN OF LEFT HIP: ICD-10-CM

## 2025-03-18 PROCEDURE — 97110 THERAPEUTIC EXERCISES: CPT

## 2025-03-18 NOTE — OP THERAPY DAILY TREATMENT
"  Outpatient Physical Therapy  DAILY TREATMENT     [facility] Outpatient Physical Therapy  [address]  [place] [address]  Phone:  [phone]  Fax:  [phone]    Date: 03/18/2025    Patient: Shabana Bailon  YOB: 1950  MRN: [id number]     Time Calculation    Start time: 1416  Stop time: 1458 Time Calculation (min): 42 minutes         Chief Complaint: Hip Problem    Visit #: 2    SUBJECTIVE:  Pt reports her hip possibly feels a little bit better. She does report she has been travelling the last 10 days and has not been on her normal routine. She reports having skied today without pain.            Therapeutic Exercises (CPT 15765):     1. Single leg bridge, 2x10 B/L, HEP    2. Sidelying hip abd, 2x8, HEP    3. Standing hip abd, NT, HEP    4. Lateral band walk, NT, HEP    5. BKFO, NT    6. Isometric hip abd hooklying, x8 (8\" holds), HEP    7. lateral step up, x8 B/L, HEP    9. Nustep, 5 mins warm up      Therapeutic Exercise Summary:         Time-based treatments/modalities:    Physical Therapy Timed Treatment Charges  Therapeutic exercise minutes (CPT 45188): 42 minutes        ASSESSMENT:   Response to treatment: Pt had good tolerance for today's PT session.  Pt will continue to benefit from skilled PT to address aforementioned deficits.      PLAN/RECOMMENDATIONS:   Plan for treatment: therapy treatment to continue next visit.  Planned interventions for next visit: continue with current treatment.         "

## 2025-03-25 ENCOUNTER — APPOINTMENT (OUTPATIENT)
Dept: PHYSICAL THERAPY | Facility: MEDICAL CENTER | Age: 75
End: 2025-03-25
Attending: FAMILY MEDICINE
Payer: MEDICARE

## 2025-04-01 ENCOUNTER — APPOINTMENT (OUTPATIENT)
Dept: PHYSICAL THERAPY | Facility: MEDICAL CENTER | Age: 75
End: 2025-04-01
Attending: FAMILY MEDICINE
Payer: MEDICARE

## 2025-04-08 ENCOUNTER — APPOINTMENT (OUTPATIENT)
Dept: PHYSICAL THERAPY | Facility: MEDICAL CENTER | Age: 75
End: 2025-04-08
Attending: FAMILY MEDICINE
Payer: MEDICARE

## 2025-04-15 ENCOUNTER — APPOINTMENT (OUTPATIENT)
Dept: PHYSICAL THERAPY | Facility: MEDICAL CENTER | Age: 75
End: 2025-04-15
Attending: FAMILY MEDICINE
Payer: MEDICARE

## 2025-04-22 ENCOUNTER — APPOINTMENT (OUTPATIENT)
Dept: PHYSICAL THERAPY | Facility: MEDICAL CENTER | Age: 75
End: 2025-04-22
Attending: FAMILY MEDICINE
Payer: MEDICARE

## 2025-04-27 ENCOUNTER — PATIENT MESSAGE (OUTPATIENT)
Dept: MEDICAL GROUP | Facility: MEDICAL CENTER | Age: 75
End: 2025-04-27
Payer: MEDICARE

## 2025-04-27 DIAGNOSIS — S43.005D DISLOCATION OF LEFT SHOULDER JOINT, SUBSEQUENT ENCOUNTER: ICD-10-CM

## 2025-04-29 ENCOUNTER — APPOINTMENT (OUTPATIENT)
Dept: PHYSICAL THERAPY | Facility: MEDICAL CENTER | Age: 75
End: 2025-04-29
Attending: FAMILY MEDICINE
Payer: MEDICARE

## 2025-05-02 NOTE — Clinical Note
REFERRAL APPROVAL NOTICE         Sent on May 2, 2025                   Shabana Bailon  [address] [facility] [address]                   Dear Ms. Bailon,    After a careful review of the medical information and benefit coverage, [facility] has processed your referral. See below for additional details.    If applicable, you must be actively enrolled with your insurance for coverage of the authorized service. If you have any questions regarding your coverage, please contact your insurance directly.    REFERRAL INFORMATION   Referral #:  [id number]  Referred-To Department    Referred-By Provider:  Orthopedics    CHARLES Yu   Gabriele [facility] (joint)      [address]  [address]  [facility] [address] [phone] [address] [place] [id number] [phone]    Referral Start Date:  04/28/2025  Referral End Date:   04/28/2026             SCHEDULING  If you do not already have an appointment, please call [phone] to make an appointment.     MORE INFORMATION  If you do not already have a Riskclick account, sign up at: [email or web address]  You can access your medical information, make appointments, see lab results, billing information, and more.  If you have questions regarding this referral, please contact  the [facility] Referrals department at:             [phone]. Monday - Friday 8:00AM - 5:00PM.     Sincerely,    [facility]

## 2025-05-06 ENCOUNTER — PHYSICAL THERAPY (OUTPATIENT)
Dept: PHYSICAL THERAPY | Facility: MEDICAL CENTER | Age: 75
End: 2025-05-06
Attending: FAMILY MEDICINE
Payer: MEDICARE

## 2025-05-06 ENCOUNTER — PATIENT MESSAGE (OUTPATIENT)
Dept: MEDICAL GROUP | Facility: MEDICAL CENTER | Age: 75
End: 2025-05-06

## 2025-05-06 DIAGNOSIS — M25.552 PAIN OF LEFT HIP: ICD-10-CM

## 2025-05-06 DIAGNOSIS — M25.512 ACUTE PAIN OF LEFT SHOULDER: ICD-10-CM

## 2025-05-06 DIAGNOSIS — S43.005S SHOULDER DISLOCATION, LEFT, SEQUELA: ICD-10-CM

## 2025-05-06 PROCEDURE — 97110 THERAPEUTIC EXERCISES: CPT

## 2025-05-06 PROCEDURE — 97530 THERAPEUTIC ACTIVITIES: CPT

## 2025-05-06 NOTE — OP THERAPY DAILY TREATMENT
Outpatient Physical Therapy  DAILY TREATMENT     [facility] Outpatient Physical Therapy  [address]  [place] [address]  Phone:  [phone]  Fax:  [phone]    Date: 05/06/2025    Patient: Shabana Bailon  YOB: 1950  MRN: [id number]     Time Calculation    Start time: 1415  Stop time: 1457 Time Calculation (min): 42 minutes         Chief Complaint: Shoulder Problem    Visit #: 3    SUBJECTIVE:  Pt is being re-evaluated today for recent injury to the L shoulder following a fall while on holiday in [facility].    OBJECTIVE:  Current objective measures:   AROM:        dynamometer:   R: 1. 44, 2. 45, 3. 46    L: 1. 15, 2. 15, 3. 14           Therapeutic Exercises (CPT 92018):     1. Wall walks, x10, HEP    2. Wall walk scaption, x10, HEP    3. Shoulder isometric flexion, x7 (8 seconds), tolerance decreased as repetitions increased - HEP    4. Shoulder isometric abd, x8 (8 seconds), HEP      Therapeutic Exercise Summary:       Therapeutic Treatments and Modalities:     1. Therapeutic Activities (CPT 96153), education/re-evaluation    Time-based treatments/modalities:    Physical Therapy Timed Treatment Charges  Therapeutic activity minutes (CPT 03520): 20 minutes  Therapeutic exercise minutes (CPT 84216): 22 minutes      ASSESSMENT:   Response to treatment: See re-evaluation note.    PLAN/RECOMMENDATIONS:   Plan for treatment: therapy treatment to continue next visit.  Planned interventions for next visit: continue with current treatment.

## 2025-05-06 NOTE — OP THERAPY PROGRESS SUMMARY
Outpatient Physical Therapy  RE-EVALUATION NOTE      [facility] Outpatient Physical Therapy  [address]  [place] [address]  Phone:  [phone]  Fax:  [phone]    Date of Visit: 05/06/2025    Patient: Shabana Bailon  YOB: 1950  MRN: [id number]     Referring Provider: CHARLES Yu  [address]  [place] [address]   Referring Diagnosis Unilateral primary osteoarthritis, left hip [M16.12]     Visit Diagnoses     ICD-10-CM   1. Shoulder dislocation, left, sequela  S43.005S   2. Pain of left hip  M25.552       Rehab Potential:     Progress Report Period: 2/26/25-5/6/25  *pt was travelling aboard, this is the reason for delay between progress notes*    Functional Assessment Used          Objective Findings and Assessment:   Patient progression towards goals: New goals to be established for L shoulder case.      Objective findings and assessment details: Pt [place], she tripped on her shoe, fell forward resulting in a  L shoulder dislocation. She went to an MD in [facility] who was able to re approximate the joint. She currently reports 1/10 pain.     Flexion: 137  ABD: 50 degrees - pain/numbness in hand  ER: 62 degrees  IR: WNL's  EXT: 48    Objective testing:  Apprehension (positive)  Lupillo: positive  Painful arc: positive    Goals:   Short Term Goals:   1. Pt will achieve 90 degrees of AROM of L shoulder abd pain free or near pain free (2/10 or lower on numeric pain rating scale)  2. Pt will complete Quick DASH for baseline  Short term goal time span:  2-4 weeks      Long Term Goals:    1. Pt will achieve 90% of L  strength as compared to R  strength  2. Pt will achieve AROM of L shoulder abd within norml limits  3. Pt will achieve MDC of Quick DASH  Long term goal time span:  6-8 weeks    Plan:   Planned therapy interventions:  Neuromuscular Re-education (CPT 41267), Manual Therapy (CPT  41999), Therapeutic Exercise (CPT 95879) and Therapeutic Activities (CPT 58885)  Frequency:  2x week  Duration in weeks:  8      Referring provider co-signature:  I have reviewed this plan of care and my co-signature certifies the need for services.     Certification Period: 05/06/2025 to 07/05/25    Physician Signature: ________________________________ Date: ______________

## 2025-05-13 NOTE — Clinical Note
REFERRAL APPROVAL NOTICE         Sent on May 13, 2025                   Shabana Bailon  [address] [facility]  [place] [address]                   Dear Ms. Bailon,    After a careful review of the medical information and benefit coverage, [facility] has processed your referral. See below for additional details.    If applicable, you must be actively enrolled with your insurance for coverage of the authorized service. If you have any questions regarding your coverage, please contact your insurance directly.    REFERRAL INFORMATION   Referral #:  [id number]  Referred-To Department    Referred-By Provider:  Physical Therapy    CHARLES Yu   [facility] [address]      [address]  [address]  [facility] [id number] [phone] [address]  [address]  [facility] [id number] [phone]    Referral Start Date:  05/06/2025  Referral End Date:   05/06/2026             SCHEDULING  If you do not already have an appointment, please call [phone] to make an appointment.     MORE INFORMATION  If you do not already have a [employer]alis account, sign up at: [email or web address]  You can access your medical information, make appointments, see lab results, billing information, and more.  If you have questions regarding this referral, please contact  the [facility] Referrals department at:             [phone]. Monday - Friday 8:00AM - 5:00PM.     Sincerely,    [facility]

## 2025-05-19 DIAGNOSIS — Z00.6 CLINICAL TRIAL PARTICIPANT: ICD-10-CM

## 2025-05-19 DIAGNOSIS — Z00.6 RESEARCH STUDY PATIENT: Primary | ICD-10-CM

## 2025-05-20 ENCOUNTER — APPOINTMENT (OUTPATIENT)
Dept: PHYSICAL THERAPY | Facility: MEDICAL CENTER | Age: 75
End: 2025-05-20
Attending: FAMILY MEDICINE
Payer: MEDICARE

## 2025-05-21 ENCOUNTER — PHYSICAL THERAPY (OUTPATIENT)
Dept: PHYSICAL THERAPY | Facility: MEDICAL CENTER | Age: 75
End: 2025-05-21
Attending: FAMILY MEDICINE
Payer: MEDICARE

## 2025-05-21 DIAGNOSIS — S43.005S SHOULDER DISLOCATION, LEFT, SEQUELA: Primary | ICD-10-CM

## 2025-05-21 PROCEDURE — 97110 THERAPEUTIC EXERCISES: CPT

## 2025-05-21 PROCEDURE — 97530 THERAPEUTIC ACTIVITIES: CPT

## 2025-05-21 NOTE — OP THERAPY DAILY TREATMENT
Outpatient Physical Therapy  DAILY TREATMENT     [facility] Outpatient Physical Therapy  [address]  [place] [address]  Phone:  [phone]  Fax:  [phone]    Date: 05/21/2025    Patient: Shabana Bailon  YOB: 1950  MRN: [id number]     Time Calculation    Start time: 0933  Stop time: 1016 Time Calculation (min): 43 minutes         Chief Complaint: Shoulder Problem    Visit #: 4    SUBJECTIVE:  Pt reports her shoulder is improving, the exercises are getting easier. She does report she had an MRI. MRI findings were reviewed with patient.     OBJECTIVE:  Current objective measures:     MRI findings:  No evidence of glenohumeral joint dislocation  Humeral avulsion of glenohumeral ligament (HAGL lesion)  Tear of the superior labrum posteriorly. Tendinopathy and tenosynovitis of the long head of the biceps tendon  Grade 2 strain of the subscapularis muscle belly without evidence of rotator cuff tear          Therapeutic Exercises (CPT 11170):     1. Wall walks, x10, HEP    2. Wall walk scaption, x10, HEP    3. Shoulder isometric flexion, x7 (8 seconds), tolerance decreased as repetitions increased - HEP    4. Shoulder isometric abd, x8 (8 seconds), HEP    5. Rows, GTB 2x10, HEP    6. Pulleys, Flexion, ABD    7. IR isometrics, x8 (8 second holds), HEP    8. Resisted shoulder flexion, 1# x15, HEP    9. Resisted shoulder scaption, 1# x8, HEP    10. Sidelying abd, NT      Therapeutic Exercise Summary:       Therapeutic Treatments and Modalities:     1. Therapeutic Activities (CPT 50751), education    Therapeutic Treatment and Modalities Summary: Education was provided today regarding findings from MRI. Recommendations to continue with conservative care at this time were made. Further recommendations regarding conservative vs surgical intervention will be addressed once re-assessment is performed.     Time-based treatments/modalities:    Physical Therapy Timed  Treatment Charges  Therapeutic activity minutes (CPT 18119): 10 minutes  Therapeutic exercise minutes (CPT 96737): 33 minutes      ASSESSMENT:   Response to treatment: Pt had good tolerance for today's PT session. Her pain has been manageable with current HEP that was implemented and progressed. Her AROM of L shoulder ABD increased 35 degrees since her re-evaluation which is promising for continued management of shoulder pain with conservative care. Pt will continue to benefit from skilled PT to address aforementioned deficits.      PLAN/RECOMMENDATIONS:   Plan for treatment: therapy treatment to continue next visit.  Planned interventions for next visit: continue with current treatment.

## 2025-05-27 ENCOUNTER — PHYSICAL THERAPY (OUTPATIENT)
Dept: PHYSICAL THERAPY | Facility: MEDICAL CENTER | Age: 75
End: 2025-05-27
Attending: FAMILY MEDICINE
Payer: MEDICARE

## 2025-05-27 DIAGNOSIS — M25.512 ACUTE PAIN OF LEFT SHOULDER: Primary | ICD-10-CM

## 2025-05-27 PROCEDURE — 97110 THERAPEUTIC EXERCISES: CPT

## 2025-05-27 PROCEDURE — 97530 THERAPEUTIC ACTIVITIES: CPT

## 2025-05-27 NOTE — OP THERAPY DAILY TREATMENT
Outpatient Physical Therapy  DAILY TREATMENT     [facility] Outpatient Physical Therapy  [address]  [place] [address]  Phone:  [phone]  Fax:  [phone]    Date: 05/27/2025    Patient: Shabana Bailon  YOB: 1950  MRN: [id number]     Time Calculation    Start time: 0900  Stop time: 0944 Time Calculation (min): 44 minutes         Chief Complaint: Shoulder Problem    Visit #: 1    SUBJECTIVE:  Pt reports her sleep continues to be a major point of discomfort for her, reporting about 5-5.5 hours of sleep despite being in bed for 8-9 hours a night.     OBJECTIVE:  Current objective measures:   134 flexion  101 abd            Therapeutic Exercises (CPT 63221):     1. Wall walks, x10, HEP    2. Wall walk scaption, x10, HEP    3. Shoulder isometric flexion, x8 (8 seconds), tolerance decreased as repetitions increased - HEP    4. Shoulder isometric abd, x8 (8 seconds), HEP    5. Rows, GTB 2x10, HEP    6. Pulleys, NT    7. IR isometrics, x8 (8 second holds), HEP    8. Resisted shoulder flexion, NT, HEP    9. Resisted shoulder scaption, NT, HEP    11. Wall crcles (stability serratus press), x10 CW, x10 CCW, HEP      Therapeutic Exercise Summary:       Therapeutic Treatments and Modalities:     1. Therapeutic Activities (CPT 42607), education    Therapeutic Treatment and Modalities Summary: Education was provided today regarding findings from MRI. Recommendations to continue with conservative care at this time were made. Further recommendations regarding conservative vs surgical intervention will be addressed once re-assessment is performed.     Time-based treatments/modalities:    Physical Therapy Timed Treatment Charges  Therapeutic activity minutes (CPT 93589): 15 minutes  Therapeutic exercise minutes (CPT 91804): 29 minutes        ASSESSMENT:   Response to treatment: Pt was educated today extensively regarding progressive return to golf swings/driving range  practice with high loft irons and impact recommendations during hitting balls. Further education regarding contacting her surgeon regarding recommendations for surgery based on MRI findings. Pt will continue to benefit from skilled PT to address aforementioned deficits.     PLAN/RECOMMENDATIONS:   Plan for treatment: therapy treatment to continue next visit.  Planned interventions for next visit: continue with current treatment.

## 2025-05-28 ENCOUNTER — HOSPITAL ENCOUNTER (OUTPATIENT)
Facility: MEDICAL CENTER | Age: 75
End: 2025-05-28
Attending: FAMILY MEDICINE
Payer: SUBSIDIZED

## 2025-05-28 DIAGNOSIS — Z00.6 RESEARCH STUDY PATIENT: ICD-10-CM

## 2025-05-30 LAB
ELF SCORE: 10.01 -
HA (HYALURONIC ACID): 119.41 NG/ML
PIIINP (AMINO-TERMINAL PROPEPTIDE): 7.21 NG/ML
RELATIVE RISK: ABNORMAL
RISK GROUP: ABNORMAL
RISK: 23.6 %
TIMP-1 (TISSUE INHIBITOR OF MMP1): 251.4 NG/ML

## 2025-06-03 ENCOUNTER — PHYSICAL THERAPY (OUTPATIENT)
Dept: PHYSICAL THERAPY | Facility: MEDICAL CENTER | Age: 75
End: 2025-06-03
Attending: FAMILY MEDICINE
Payer: MEDICARE

## 2025-06-03 DIAGNOSIS — S43.005S SHOULDER DISLOCATION, LEFT, SEQUELA: ICD-10-CM

## 2025-06-03 DIAGNOSIS — M25.512 ACUTE PAIN OF LEFT SHOULDER: Primary | ICD-10-CM

## 2025-06-03 PROCEDURE — 97110 THERAPEUTIC EXERCISES: CPT

## 2025-06-03 NOTE — OP THERAPY DAILY TREATMENT
Outpatient Physical Therapy  DAILY TREATMENT     [facility] Outpatient Physical Therapy  [address]  [place] [address]  Phone:  [phone]  Fax:  [phone]    Date: 06/03/2025    Patient: Shabana Bailon  YOB: 1950  MRN: [id number]     Time Calculation    Start time: 1415  Stop time: 1457 Time Calculation (min): 42 minutes         Chief Complaint: Shoulder Problem    Visit #: 2    SUBJECTIVE:  My biggest problem is sleep. The pain is quite severe; only able to sleep about 1.5 hrs at a time. The HEP is going well. I find it challenging to raise my arm out to the side.     OBJECTIVE:      Therapeutic Exercises (CPT 74537):     1. UBE, L2 x 4 min alt    2. Ball rolls on table, multiplane flexion x 2 min    3. Ball pendulums, CW/CCW x 10 ea    4. Unilateral row, 10# 2x10    5. tricep press, orange 2x10    6. ER/IR isometric walkouts, orange x 10 ea, asx, feels strong    7. TB shoulder ER/IR, orange x 20 ea, towel under elbow    8. TB shoulder planes 4 way, orange x 10 ea, flexion and abduction were challenging, but able to produce small range      Therapeutic Exercise Summary:     Access Code: [email or web address]  URL: [email or web address]  Date: 06/03/2025  Prepared by:     Exercises  - Standing Shoulder Internal Rotation with Anchored Resistance  - 5 x weekly - 2 sets - 15 reps  - Shoulder External Rotation with Anchored Resistance  - 5 x weekly - 2 sets - 15 reps      Time-based treatments/modalities:    Physical Therapy Timed Treatment Charges  Therapeutic exercise minutes (CPT 09691): 42 minutes    ASSESSMENT:   Response to treatment: Substantial improvement on load tolerance today. Able to advance from isometric to small range RROM vs light load. Will continue to benefit from skilled PT for recovery of full functional AROM and strength progressions to promote shoulder stability and functional loading tolerance.     PLAN/RECOMMENDATIONS:   Plan for  treatment: therapy treatment to continue next visit.  Planned interventions for next visit: continue with current treatment.

## 2025-06-04 ENCOUNTER — RESULTS FOLLOW-UP (OUTPATIENT)
Dept: MEDICAL GROUP | Facility: PHYSICIAN GROUP | Age: 75
End: 2025-06-04
Payer: MEDICARE

## 2025-06-09 ENCOUNTER — RESULTS FOLLOW-UP (OUTPATIENT)
Dept: MEDICAL GROUP | Facility: PHYSICIAN GROUP | Age: 75
End: 2025-06-09
Payer: MEDICARE

## 2025-06-09 LAB
APOB+LDLR+PCSK9 GENE MUT ANL BLD/T: NOT DETECTED
BRCA1+BRCA2 DEL+DUP + FULL MUT ANL BLD/T: NOT DETECTED
MLH1+MSH2+MSH6+PMS2 GN DEL+DUP+FUL M: NOT DETECTED

## 2025-06-10 ENCOUNTER — TELEMEDICINE (OUTPATIENT)
Dept: MEDICAL GROUP | Facility: PHYSICIAN GROUP | Age: 75
End: 2025-06-10
Payer: MEDICARE

## 2025-06-10 VITALS
DIASTOLIC BLOOD PRESSURE: 95 MMHG | WEIGHT: 130 LBS | HEART RATE: 87 BPM | BODY MASS INDEX: 19.7 KG/M2 | HEIGHT: 68 IN | SYSTOLIC BLOOD PRESSURE: 132 MMHG

## 2025-06-10 DIAGNOSIS — I10 PRIMARY HYPERTENSION: ICD-10-CM

## 2025-06-10 DIAGNOSIS — Z83.49 FAMILY HISTORY OF HEMOCHROMATOSIS: ICD-10-CM

## 2025-06-10 DIAGNOSIS — R79.89 ABNORMAL LIVER FUNCTION TEST: Primary | ICD-10-CM

## 2025-06-10 PROCEDURE — 99214 OFFICE O/P EST MOD 30 MIN: CPT | Performed by: NURSE PRACTITIONER

## 2025-06-10 ASSESSMENT — FIBROSIS 4 INDEX: FIB4 SCORE: 0.95

## 2025-06-10 ASSESSMENT — PATIENT HEALTH QUESTIONNAIRE - PHQ9: CLINICAL INTERPRETATION OF PHQ2 SCORE: 0

## 2025-06-10 NOTE — Clinical Note
Saw patient for ELF results.  She mentioned a recent hypertension diagnosis and that she is having some ankle swelling associated with the 10 mg of amlodipine.  She has been to be checking her blood pressure over the next few days but states the diastolic is still been elevated :)

## 2025-06-10 NOTE — PROGRESS NOTES
Virtual Visit: Established Patient   This visit was conducted via Teams using secure and encrypted videoconferencing technology.   The patient was in their home in the [facility].    The patient's identity was confirmed and verbal consent was obtained for this virtual visit.     Verbal consent was acquired by the patient to use Nephera ambient listening note generation during this visit Yes      Subjective:   CC:   Chief Complaint   Patient presents with    Lab Results     Elf results       Shabana Bailon is a 75 y.o. female presenting for evaluation and management of:        ELF score: 10.01  BMI: 19.77  T2DM: no  HTN: no  Metabolic Syndrome: dyslipidemia: no, elevated BP: no, impaired glucose tolerance: yes, central obesity: no  Hyperlipidemia: no  JONES/NAFLD: unknown  Elevated liver enzymes: no  U/S: not performed     History of Present Illness  The patient is a 75-year-old female who presents via virtual visit today to follow up on her ELF results. Her ELF score was 10.01. BMI is 19.77. Most recent hemoglobin A1c is 5.7. She is currently on metformin 1000 mg twice daily.    Ankle Swelling  She has been experiencing ankle swelling, which she attributes to her amlodipine medication. She has not taken any other antihypertensive medications apart from amlodipine.  - Onset: Since starting amlodipine.  - Location: Ankles.  - Character: Swelling.  - Alleviating/Aggravating Factors: Attributed to amlodipine.    Elevated Diastolic Blood Pressure  She reports that her diastolic blood pressure readings have consistently been elevated.  - Character: Elevated diastolic blood pressure readings.  - Timing: Consistently elevated.    She continues her regimen of metformin 1000 mg twice daily.    FAMILY HISTORY  She reports that her daughter carries a variant for hemochromatosis.         Current Medications and Prescriptions Ordered in Epic[1]     ROS:  Gen: no fevers/chills, no changes in weight  Eyes: no changes in  "vision  ENT: no sore throat, no hearing loss, no bloody nose  Pulm: no sob, no cough  CV: no chest pain, no palpitations  GI: no nausea/vomiting, no diarrhea  : no dysuria  MSk: no myalgias  Skin: no rash  Neuro: no headaches, no numbness/tingling  Heme/Lymph: no easy bruising        Objective:      Exam:  BP (!) 132/95 (BP Location: Right arm, Patient Position: Sitting) Comment: home bp cuff  Pulse 87   Ht 1.727 m (5' 8\")   Wt 59 kg (130 lb) Comment: pt reported  BMI 19.77 kg/m²  Body mass index is 19.77 kg/m².     Physical Exam:  Constitutional: Alert, no distress, well-groomed.  Skin: No rashes in visible areas.  Eye: Round. Conjunctiva clear, lids normal. No icterus.   ENMT: Lips pink without lesions, good dentition, moist mucous membranes. Phonation normal.  Neck: No masses, no thyromegaly. Moves freely without pain.  Respiratory: Unlabored respiratory effort, no cough or audible wheeze  Psych: Alert and oriented x3, normal affect and mood.        Assessment & Plan:      75 y.o. female with the following -     Problem List Items Addressed This Visit       Primary hypertension     Other Visit Diagnoses         Abnormal liver function test    -  Primary    Relevant Orders    IRON/TOTAL IRON BIND    MARY LOU REFLEXIVE PROFILE    US-RUQ    FERRITIN      Family history of hemochromatosis        Relevant Orders    IRON/TOTAL IRON BIND          Assessment & Plan  1. Elevated ELF score: Potentially influenced by biotin intake or fatty liver changes associated with increased hemoglobin A1c. CBC was normal in 12/2024. Hepatitis C screening was negative, and hepatitis B vaccination was received.  - Recommended tests include MARY LOU screening for autoimmune hepatitis, iron total binding test, and ferritin test to assess for hemochromatosis.  - An abdominal ultrasound will be ordered to evaluate spleen and portal vein size, and any changes in echogenicity indicating fatty or fibrotic changes.  - Blood tests and ultrasound can " be scheduled via phone or MyChart.  - Results will be communicated once available.    2. Hypertension: ankle swelling side effect of amlodipine.  - Diuretics like hydrochlorothiazide or chlorthalidone may help with diastolic blood pressure but can cause frequent urination.  - Alternatives like olmesartan or valsartan, could be considered, to avoid side effects including frequent urination and ankle swelling  -States that she would like to discuss this with her primary care provider  - A note will be sent to Mary Boothe regarding additional tests ordered to monitor liver function and ongoing diastolic elevation and ankle swelling.    3. Diabetes Mellitus: Currently on metformin 1000 mg twice daily. Most recent hemoglobin A1c is 5.7.    Follow-up  - Blood testing for hemochromatosis and abdominal ultrasound.  - Monitoring of blood pressure and communication with Mary regarding ongoing diastolic elevation and ankle swelling.         DISCUSSION/RECOMMENDATIONS:  -The ELF test is a test to look at active fibrosis, it looks at 3 markers to give a composite risk score indicating risk of developing liver disease in the future.   -Making changes to diet and exercise can reduce risk, a 10% weight loss can be enough.  -After results discuss medications that could be helpful based on patient history: pioglitazone, semaglutide, or Vitamin E.  -Discussed diet changes to reduce risk: Reduce/eliminate foods including carbonated drinks/sweetened drinks, food/drink with high fructose corn syrup, refined and processed foods.   -Discussed more specific diet changes based on 24-hour diet recall.  -Consider whether patient may qualify for bariatric surgery for weight reduction     I spent a total of 32 minutes with record review, exam, communication with the patient, communication with other providers, and documentation of this encounter.        Return if symptoms worsen or fail to improve.     Please note that this dictation was  created using voice recognition software. I have made every reasonable attempt to correct obvious errors, but I expect that there are errors of grammar and possibly content that I did not discover before finalizing the note.            [1]   Current Outpatient Medications Ordered in Epic   Medication Sig Dispense Refill    celecoxib (CELEBREX) 100 MG Cap Take 1 capsule by mouth twice daily 60 Capsule 0    amLODIPine (NORVASC) 10 MG Tab Take 1 Tablet by mouth every day.      buPROPion SR (WELLBUTRIN-SR) 150 MG TABLET SR 12 HR sustained-release tablet Take 1 Tablet by mouth 2 times a day.      metFORMIN (GLUCOPHAGE) 500 MG Tab Take 2 Tablets by mouth 2 times a day with meals.      Estradiol (ESTROGEL) 0.75 MG/1.25 GM (0.06%) Gel EstroGel 1.25 gram/actuation (0.06%) transdermal gel pump      CALCIUM CARBONATE-VIT D-MIN PO Take  by mouth.      NORETHINDRONE ACETATE PO Take  by mouth every 3 months.      Turmeric (QC TUMERIC COMPLEX PO) Take  by mouth.      Multiple Vitamins-Minerals (OCUVITE-LUTEIN) Tab Take 1 Tablet by mouth every day.      B Complex Vitamins (VITAMIN B COMPLEX PO) Take  by mouth.      meloxicam (MOBIC) 15 MG tablet Take 15 mg by mouth as needed.      valACYclovir (VALTREX) 500 MG Tab Take 500 mg by mouth as needed.       No current Epic-ordered facility-administered medications on file.

## 2025-06-11 ENCOUNTER — PATIENT MESSAGE (OUTPATIENT)
Dept: MEDICAL GROUP | Facility: MEDICAL CENTER | Age: 75
End: 2025-06-11
Payer: MEDICARE

## 2025-06-17 ENCOUNTER — HOSPITAL ENCOUNTER (OUTPATIENT)
Facility: MEDICAL CENTER | Age: 75
End: 2025-06-17
Attending: NURSE PRACTITIONER
Payer: MEDICARE

## 2025-06-17 DIAGNOSIS — R79.89 ABNORMAL LIVER FUNCTION TEST: ICD-10-CM

## 2025-06-17 DIAGNOSIS — Z83.49 FAMILY HISTORY OF HEMOCHROMATOSIS: ICD-10-CM

## 2025-06-17 LAB
IRON SATN MFR SERPL: 24 % (ref 15–55)
IRON SERPL-MCNC: 78 UG/DL (ref 40–170)
TIBC SERPL-MCNC: 330 UG/DL (ref 250–450)
UIBC SERPL-MCNC: 252 UG/DL (ref 110–370)

## 2025-06-17 PROCEDURE — 86038 ANTINUCLEAR ANTIBODIES: CPT

## 2025-06-17 PROCEDURE — 83550 IRON BINDING TEST: CPT

## 2025-06-17 PROCEDURE — 82728 ASSAY OF FERRITIN: CPT

## 2025-06-17 PROCEDURE — 36415 COLL VENOUS BLD VENIPUNCTURE: CPT

## 2025-06-17 PROCEDURE — 83540 ASSAY OF IRON: CPT

## 2025-06-18 LAB — FERRITIN SERPL-MCNC: 68.1 NG/ML (ref 10–291)

## 2025-06-18 NOTE — OP THERAPY DAILY TREATMENT
"  Outpatient Physical Therapy  DAILY TREATMENT     [facility] Outpatient Physical Therapy  [address]  [place] [address]  Phone:  [phone]  Fax:  [phone]    Date: 06/19/2025    Patient: Shabana Bailon  YOB: 1950  MRN: [id number]     Time Calculation    Start time: 1446  Stop time: 1532 Time Calculation (min): 46 minutes         Chief Complaint: Shoulder Problem    Visit #: 5    SUBJECTIVE:  My shoulder is improving. I feel good during the day and with my exercises, but I am not getting any sleep. My PCP did provide me with percocet, which is help. It feels like I just can't tolerate being still. My HEP helps, but I can't do those every hour.     OBJECTIVE:      Therapeutic Exercises (CPT 07615):     1. UBE, L2 x 4 min alt    2. 1/2 foam roller, diaphragm breathing, alt GH flexion, star gazer, jak, Painful (3/10) on initiation of L shoulder flexion from behind body line, but improves with reps. Tried 90/90 chest openers, but caused a \"click\" in the shoulder.    3. jake pose rock backs, x 2 min in varried planes, does produce some subacromial pain and some NT in the hand (ulnar)    4. Horizontal abduction at 90 deg, orange x 10 (palm down, palm up)    5. shoulder ER at 90/90 on table, 2# x 10, orange x 10      Therapeutic Exercise Summary:   Access Code: [email or web address]  URL: [email or web address]  Date: 06/19/2025  Prepared by:     Exercises  - Shoulder External Rotation with Anchored Resistance  - 5 x weekly - 2 sets - 15 reps  - Supine Chest Stretch with Elbows Bent  - 1 x daily - 2 minutes hold  - Standing Shoulder Horizontal Abduction with Resistance  - 1 x daily - 2-3 sets - 10 reps  - Seated Shoulder External Rotation in Abduction Supported with Dumbbell  - 1 x daily - 2-3 sets - 10 reps    Therapeutic Treatments and Modalities:     1. Manual Therapy (CPT 08620), Supine: Inf/post/ant end range GHJ mobs with PROM respectively. Neural " tension test: positive L ulnar.    Time-based treatments/modalities:    Physical Therapy Timed Treatment Charges  Manual therapy minutes (CPT 50356): 10 minutes  Therapeutic exercise minutes (CPT 23207): 32 minutes    ASSESSMENT:   Response to treatment: PROM is progressing, but still restricted in 90 abd to 75 deg and elevation= 160 deg with early scap rise and reduced dissociation in the axilla. Was ready for strength progressions to 90 degrees for posterior cuff. Monitor response to HEP progression    PLAN/RECOMMENDATIONS:   Plan for treatment: therapy treatment to continue next visit.  Planned interventions for next visit: continue with current treatment.

## 2025-06-19 ENCOUNTER — PHYSICAL THERAPY (OUTPATIENT)
Dept: PHYSICAL THERAPY | Facility: MEDICAL CENTER | Age: 75
End: 2025-06-19
Attending: FAMILY MEDICINE
Payer: MEDICARE

## 2025-06-19 ENCOUNTER — HOSPITAL ENCOUNTER (OUTPATIENT)
Facility: MEDICAL CENTER | Age: 75
End: 2025-06-19
Attending: FAMILY MEDICINE
Payer: MEDICARE

## 2025-06-19 ENCOUNTER — APPOINTMENT (OUTPATIENT)
Dept: MEDICAL GROUP | Facility: MEDICAL CENTER | Age: 75
End: 2025-06-19
Payer: MEDICARE

## 2025-06-19 ENCOUNTER — RESULTS FOLLOW-UP (OUTPATIENT)
Dept: MEDICAL GROUP | Facility: PHYSICIAN GROUP | Age: 75
End: 2025-06-19

## 2025-06-19 ENCOUNTER — OFFICE VISIT (OUTPATIENT)
Dept: MEDICAL GROUP | Facility: MEDICAL CENTER | Age: 75
End: 2025-06-19
Payer: MEDICARE

## 2025-06-19 VITALS
DIASTOLIC BLOOD PRESSURE: 62 MMHG | TEMPERATURE: 98.1 F | SYSTOLIC BLOOD PRESSURE: 120 MMHG | WEIGHT: 134.4 LBS | HEIGHT: 68 IN | HEART RATE: 94 BPM | OXYGEN SATURATION: 96 % | BODY MASS INDEX: 20.37 KG/M2

## 2025-06-19 DIAGNOSIS — S43.005S SHOULDER DISLOCATION, LEFT, SEQUELA: ICD-10-CM

## 2025-06-19 DIAGNOSIS — M25.512 ACUTE PAIN OF LEFT SHOULDER: ICD-10-CM

## 2025-06-19 DIAGNOSIS — Z79.899 HIGH RISK MEDICATION USE: ICD-10-CM

## 2025-06-19 DIAGNOSIS — I10 PRIMARY HYPERTENSION: Primary | ICD-10-CM

## 2025-06-19 DIAGNOSIS — M25.512 ACUTE PAIN OF LEFT SHOULDER: Primary | ICD-10-CM

## 2025-06-19 DIAGNOSIS — M79.89 BILATERAL SWELLING OF FEET: ICD-10-CM

## 2025-06-19 PROBLEM — L81.4 OTHER MELANIN HYPERPIGMENTATION: Status: RESOLVED | Noted: 2025-06-19 | Resolved: 2025-06-19

## 2025-06-19 PROBLEM — D22.9 MELANOCYTIC NEVUS OF SKIN: Status: RESOLVED | Noted: 2025-04-30 | Resolved: 2025-06-19

## 2025-06-19 LAB — NUCLEAR IGG SER QL IA: NORMAL

## 2025-06-19 PROCEDURE — 99214 OFFICE O/P EST MOD 30 MIN: CPT | Performed by: FAMILY MEDICINE

## 2025-06-19 PROCEDURE — 97140 MANUAL THERAPY 1/> REGIONS: CPT

## 2025-06-19 PROCEDURE — 3074F SYST BP LT 130 MM HG: CPT | Performed by: FAMILY MEDICINE

## 2025-06-19 PROCEDURE — 97110 THERAPEUTIC EXERCISES: CPT

## 2025-06-19 PROCEDURE — G0480 DRUG TEST DEF 1-7 CLASSES: HCPCS

## 2025-06-19 PROCEDURE — 3078F DIAST BP <80 MM HG: CPT | Performed by: FAMILY MEDICINE

## 2025-06-19 PROCEDURE — 80307 DRUG TEST PRSMV CHEM ANLYZR: CPT

## 2025-06-19 RX ORDER — OXYCODONE AND ACETAMINOPHEN 5; 325 MG/1; MG/1
1-2 TABLET ORAL EVERY 6 HOURS PRN
Qty: 30 TABLET | Refills: 0 | Status: SHIPPED | OUTPATIENT
Start: 2025-06-19 | End: 2025-06-23 | Stop reason: SDUPTHER

## 2025-06-19 RX ORDER — HYDROCHLOROTHIAZIDE 12.5 MG/1
12.5 CAPSULE ORAL DAILY
Qty: 90 CAPSULE | Refills: 3 | Status: SHIPPED | OUTPATIENT
Start: 2025-06-19

## 2025-06-19 RX ORDER — OXYCODONE AND ACETAMINOPHEN 5; 325 MG/1; MG/1
1-2 TABLET ORAL EVERY 4 HOURS PRN
Qty: 30 TABLET | Refills: 0 | Status: SHIPPED | OUTPATIENT
Start: 2025-06-19 | End: 2025-06-19

## 2025-06-19 ASSESSMENT — FIBROSIS 4 INDEX: FIB4 SCORE: 0.95

## 2025-06-19 NOTE — PROGRESS NOTES
Verbal consent was acquired by the patient to use [employer] ambient listening note generation during this visit:  Yes      Chief complaint::The primary encounter diagnosis was Primary hypertension. Diagnoses of Bilateral swelling of feet, Acute pain of left shoulder, and High risk medication use were also pertinent to this visit.    Assessment and Plan:   The following treatment plan was discussed:     Assessment & Plan  1. Primary hypertension: Chronic.  - Blood pressure normal today at 120/80 mmHg. Some normal readings at home. Suspected increase in blood pressure due to shoulder pain, a chronic condition.  - Reduction of amlodipine down to 5 mg, initiate hydrochlorothiazide 12.5 mg daily.  - Continue to monitor blood pressures    2. Bilateral foot swelling: Chronic.  Stable.  - Suspected related to amlodipine dose increase.  - Reduce amlodipine to 5 mg, add hydrochlorothiazide 12.5 mg in the morning. Further evaluation if swelling persists.    3.  Chronic left shoulder pain.  - Prescription for Percocet 5/325 mg for nighttime pain and sleep. 1-2 tablets as needed.  - Advised against concurrent use of Celebrex and Advil/Aleve. If Celebrex ineffective, Advil 600 mg every 6 hours, max 12 tablets/day.  - Urine drug screen today.  - PDMP has been reviewed, controlled subs agreement form signed by the patient, urine collected.    Follow-up  - Follow up as needed.  Shabana was seen today for follow-up and foot swelling.    Diagnoses and all orders for this visit:    Primary hypertension  -     hydrochlorothiazide (MICROZIDE) 12.5 MG capsule; Take 1 Capsule by mouth every day.    Bilateral swelling of feet    Acute pain of left shoulder  -     oxyCODONE-acetaminophen (PERCOCET) 5-325 MG Tab; Take 1-2 Tablets by mouth every four hours as needed for Severe Pain or Moderate Pain for up to 10 days.  -     URINE DRUG SCREEN W/CONF (AR); Future  -     Controlled Substance Treatment Agreement    High risk medication use  -      URINE DRUG SCREEN W/CONF (AR); Future        Followup: Return if symptoms worsen or fail to improve.    Subjective/HPI:     HPI:    Shabana Bailon is a pleasant 75 y.o. female here for   Chief Complaint   Patient presents with    Follow-Up     High Bp     Foot Swelling     Both ,feet,  X 4 months         History of Present Illness  The patient is a 75-year-old individual presenting with bilateral lower extremity swelling and elevated blood pressure.    Bilateral Lower Extremity Swelling  - Experiencing swelling for 4 months, most pronounced in the morning.    Elevated Blood Pressure  - On amlodipine since 2024, initially 5 mg, increased to 10 mg.  - Blood pressure not well-controlled on 5 mg.  - Period of non-compliance with blood pressure readings of 140/100, 130/95, and 130/90.  - Diastolic consistently in the 90s.  - Blood pressure within normal limits yesterday.    Severe Shoulder Pain  - Severe shoulder pain disrupting sleep, limiting to 1.5 hours per night for 2 months.  - Felt unwell two days ago, questioning if lack of sleep contributes to elevated blood pressure.  - Orthopedic appointment tomorrow, shoulder specialist next week.  - Taking  Percocet for pain and sleep, managed 4 hours of sleep for two nights.  - Seeking Percocet refill.    Supplemental information: Taking Celebrex 100 mg twice daily, inquiring about maximum Advil dose.    Current Medicines (including changes today)  Current Medications[1]  Past Medical/ Surgical History  She  has a past medical history of Allergy, Depression, History of benign parathyroid tumor (2021), History of Helicobacter pylori infection (2021), History of loop electrical excision procedure (LEEP) (2021), Melanocytic nevus (2024), Melanocytic nevus of skin (2025), Neoplasm of uncertain behavior of skin (2024), Other melanin hyperpigmentation (2025), Parathyroid disorder (HCC), and Primary hypertension  "(06/10/2025).    She has no past medical history of Hyperlipidemia.  She  has a past surgical history that includes leep; other; and nasal polypectomy.       Objective:   /62 (BP Location: Left arm, Patient Position: Sitting, BP Cuff Size: Adult)   Pulse 94   Temp 36.7 °C (98.1 °F) (Temporal)   Ht 1.727 m (5' 8\")   Wt 61 kg (134 lb 6.4 oz)   SpO2 96%  Body mass index is 20.44 kg/m².    Physical Exam  Constitutional:       General: She is not in acute distress.     Appearance: She is not ill-appearing or toxic-appearing.   HENT:      Head: Normocephalic.      Right Ear: Tympanic membrane and external ear normal.      Left Ear: Tympanic membrane and external ear normal.      Nose: Nose normal. No rhinorrhea.      Mouth/Throat:      Mouth: Mucous membranes are moist.      Pharynx: Oropharynx is clear. No posterior oropharyngeal erythema.   Eyes:      General:         Right eye: No discharge.         Left eye: No discharge.      Conjunctiva/sclera: Conjunctivae normal.      Pupils: Pupils are equal, round, and reactive to light.   Cardiovascular:      Rate and Rhythm: Normal rate and regular rhythm.      Heart sounds: No murmur heard.  Pulmonary:      Effort: Pulmonary effort is normal. No respiratory distress.      Breath sounds: Normal breath sounds. No wheezing.   Abdominal:      General: Abdomen is flat.   Musculoskeletal:         General: No swelling or tenderness.      Cervical back: Normal range of motion and neck supple.      Right lower leg: Edema (Scant swelling noted in right foot normal in the left) present.      Left lower leg: No edema.   Skin:     General: Skin is warm.   Neurological:      General: No focal deficit present.      Mental Status: She is alert and oriented to person, place, and time. Mental status is at baseline.   Psychiatric:         Mood and Affect: Mood normal.          Lab/ Imaging Results:  No labs or imaging to review    Please note that this dictation was created using " voice recognition software. I have made every reasonable attempt to correct obvious errors, but I expect that there are errors of grammar and possibly content that I did not discover before finalizing the note.           [1]   Current Outpatient Medications   Medication Sig Dispense Refill    oxyCODONE-acetaminophen (PERCOCET) 5-325 MG Tab Take 1-2 Tablets by mouth every four hours as needed for Severe Pain or Moderate Pain for up to 10 days. 30 Tablet 0    hydrochlorothiazide (MICROZIDE) 12.5 MG capsule Take 1 Capsule by mouth every day. 90 Capsule 3    celecoxib (CELEBREX) 100 MG Cap Take 1 capsule by mouth twice daily 60 Capsule 0    amLODIPine (NORVASC) 10 MG Tab Take 1 Tablet by mouth every day.      buPROPion SR (WELLBUTRIN-SR) 150 MG TABLET SR 12 HR sustained-release tablet Take 1 Tablet by mouth 2 times a day.      metFORMIN (GLUCOPHAGE) 500 MG Tab Take 2 Tablets by mouth 2 times a day with meals.      Estradiol (ESTROGEL) 0.75 MG/1.25 GM (0.06%) Gel EstroGel 1.25 gram/actuation (0.06%) transdermal gel pump      CALCIUM CARBONATE-VIT D-MIN PO Take  by mouth.      NORETHINDRONE ACETATE PO Take  by mouth every 3 months.      Turmeric (QC TUMERIC COMPLEX PO) Take  by mouth.      Multiple Vitamins-Minerals (OCUVITE-LUTEIN) Tab Take 1 Tablet by mouth every day.      B Complex Vitamins (VITAMIN B COMPLEX PO) Take  by mouth.      meloxicam (MOBIC) 15 MG tablet Take 15 mg by mouth as needed.      valACYclovir (VALTREX) 500 MG Tab Take 500 mg by mouth as needed.       No current facility-administered medications for this visit.

## 2025-06-19 NOTE — TELEPHONE ENCOUNTER
Pharmacy comment: Please clarify the directions  for this prescription. ma xof 6/day on rx  and also max of 7 days for initial fill can md resend? thank you.

## 2025-06-21 ENCOUNTER — PATIENT MESSAGE (OUTPATIENT)
Dept: MEDICAL GROUP | Facility: MEDICAL CENTER | Age: 75
End: 2025-06-21
Payer: MEDICARE

## 2025-06-21 DIAGNOSIS — I10 PRIMARY HYPERTENSION: Primary | ICD-10-CM

## 2025-06-21 DIAGNOSIS — M25.512 ACUTE PAIN OF LEFT SHOULDER: ICD-10-CM

## 2025-06-21 LAB
AMPHET CTO UR CFM-MCNC: NEGATIVE NG/ML
BARBITURATES CTO UR CFM-MCNC: NEGATIVE NG/ML
BENZODIAZ CTO UR CFM-MCNC: NEGATIVE NG/ML
CANNABINOIDS CTO UR CFM-MCNC: NEGATIVE NG/ML
COCAINE CTO UR CFM-MCNC: NEGATIVE NG/ML
CREAT UR-MCNC: 41.4 MG/DL (ref 20–400)
DRUG COMMENT 753798: ABNORMAL
METHADONE CTO UR CFM-MCNC: NEGATIVE NG/ML
OPIATES CTO UR CFM-MCNC: ABNORMAL NG/ML
PCP CTO UR CFM-MCNC: NEGATIVE NG/ML
PROPOXYPH CTO UR CFM-MCNC: NEGATIVE NG/ML

## 2025-06-23 ENCOUNTER — RESULTS FOLLOW-UP (OUTPATIENT)
Dept: MEDICAL GROUP | Facility: MEDICAL CENTER | Age: 75
End: 2025-06-23
Payer: MEDICARE

## 2025-06-23 LAB
6MAM UR CFM-MCNC: <10 NG/ML
CODEINE UR CFM-MCNC: <20 NG/ML
HYDROCODONE UR CFM-MCNC: <20 NG/ML
HYDROMORPHONE UR CFM-MCNC: <20 NG/ML
MORPHINE UR CFM-MCNC: <20 NG/ML
NORHYDROCODONE UR CFM-MCNC: <20 NG/ML
NOROXYCODONE UR CFM-MCNC: 1844 NG/ML
OPIATES UR NOROXYM Q0836: 34 NG/ML
OXYCODONE UR CFM-MCNC: 147 NG/ML
OXYMORPHONE UR CFM-MCNC: <20 NG/ML

## 2025-06-23 RX ORDER — AMLODIPINE BESYLATE 5 MG/1
5 TABLET ORAL DAILY
Qty: 100 TABLET | Refills: 3 | Status: SHIPPED | OUTPATIENT
Start: 2025-06-23

## 2025-06-23 RX ORDER — OXYCODONE AND ACETAMINOPHEN 5; 325 MG/1; MG/1
1-2 TABLET ORAL EVERY 6 HOURS PRN
Qty: 30 TABLET | Refills: 0 | Status: SHIPPED | OUTPATIENT
Start: 2025-06-23 | End: 2025-06-30

## 2025-06-23 NOTE — TELEPHONE ENCOUNTER
Please contact the patient's pharmacy to figure out why she is unable to get her Percocet.    Thanks,  Katia

## 2025-06-24 ENCOUNTER — APPOINTMENT (OUTPATIENT)
Dept: PHYSICAL THERAPY | Facility: MEDICAL CENTER | Age: 75
End: 2025-06-24
Attending: FAMILY MEDICINE
Payer: MEDICARE

## 2025-07-01 NOTE — OP THERAPY DAILY TREATMENT
Outpatient Physical Therapy  DAILY TREATMENT     [facility] Outpatient Physical Therapy  [address]  [place] [address]  Phone:  [phone]  Fax:  [phone]    Date: 07/02/2025    Patient: Shabana Bailon  YOB: 1950  MRN: [id number]     Time Calculation    Start time: 0813  Stop time: 0858 Time Calculation (min): 45 minutes         Chief Complaint: Shoulder Problem    Visit #: 8    SUBJECTIVE:  I saw a shoulder surgeon in [place]. He thought I had frozen shoulder, which is why I was having so much night pain. He gave me a CSI and recommended I continue PT. So far it is helping. I am sleeping better.     OBJECTIVE:      Therapeutic Exercises (CPT 24580):     1. UBE, L2 x 4 min alt    2. star gazer, vs wall with isometrics and gradual increase in range as tolerated. x 5 min    3. Supine jak with 1/4 turn toward the arm, x 10 with isometric horiz abduction at top range    5. shoulder ER at 90/90 on table, 2x10      Therapeutic Exercise Summary:   Access Code: [email or web address]  URL: [email or web address]  Date: 06/19/2025  Prepared by:     Exercises  - Shoulder External Rotation with Anchored Resistance  - 5 x weekly - 2 sets - 15 reps  - Supine Chest Stretch with Elbows Bent  - 1 x daily - 2 minutes hold  - Standing Shoulder Horizontal Abduction with Resistance  - 1 x daily - 2-3 sets - 10 reps  - Seated Shoulder External Rotation in Abduction Supported with Dumbbell  - 1 x daily - 2-3 sets - 10 reps    Therapeutic Treatments and Modalities:     1. Manual Therapy (CPT 29925), Supine: Inf/post/ant end range GHJ mobs with PROM respectively. Isometric ER at end ranges of elevation and 90/90 ER    Time-based treatments/modalities:    Physical Therapy Timed Treatment Charges  Manual therapy minutes (CPT 62973): 15 minutes  Therapeutic exercise minutes (CPT 70483): 30 minutes      ASSESSMENT:   Response to treatment: See PN     PLAN/RECOMMENDATIONS:    Plan for treatment: therapy treatment to continue next visit.  Planned interventions for next visit: continue with current treatment.

## 2025-07-02 ENCOUNTER — PHYSICAL THERAPY (OUTPATIENT)
Dept: PHYSICAL THERAPY | Facility: MEDICAL CENTER | Age: 75
End: 2025-07-02
Attending: FAMILY MEDICINE
Payer: MEDICARE

## 2025-07-02 DIAGNOSIS — M25.512 ACUTE PAIN OF LEFT SHOULDER: Primary | ICD-10-CM

## 2025-07-02 DIAGNOSIS — S43.005S SHOULDER DISLOCATION, LEFT, SEQUELA: ICD-10-CM

## 2025-07-02 PROCEDURE — 97110 THERAPEUTIC EXERCISES: CPT

## 2025-07-02 PROCEDURE — 97140 MANUAL THERAPY 1/> REGIONS: CPT

## 2025-07-02 NOTE — OP THERAPY PROGRESS SUMMARY
Outpatient Physical Therapy  PROGRESS SUMMARY NOTE      [facility] Outpatient Physical Therapy  [address]  [place] [address]  Phone:  [phone]  Fax:  [phone]    Date of Visit: 07/02/2025    Patient: Shabana Bailon  YOB: 1950  MRN: [id number]     Referring Provider: CHARLES Yu  [address]  [address]  [place] [address]   Referring Diagnosis No admission diagnoses are documented for this encounter.     Visit Diagnoses     ICD-10-CM   1. Acute pain of left shoulder  M25.512   2. Shoulder dislocation, left, sequela  S43.005S       Rehab Potential: good    Progress Report Period: 5/6/25-7/2/25    Functional Assessment Used          Objective Findings and Assessment:   Patient progression towards goals:   Shabana has been seen for 8 PT sessions supporting the management of her L shoulder injury related to a shoulder dislocation event in April of 2025. Imaging indicated a HAGL avulsion and G2 sprain of the subscap. Treatment has involved manual therapy and progressive therex for restoration of mobility and strength. Her clinic attendance fluctuates due to frequent travel, but she is compliant with her HEP and showing progress in ROM overall. She is primarily limited in anterior GHJ translation in 90/90 ER and abduction over 90 degrees. There is shoulder hiking compensation with elevation in scaption and abduction and associated weakness in the posterior cuff. She was most concerned about the persistent night pain, but has had some improvement after a recent CSI. Given the continued limitation in functional reach, recommending continuation of skilled PT services.     L shoulder AROM  Flexion: 150  Scaption: 150  ABD: 110  ER: 80 degrees (painful anterior GH and into the elbow)  IR: WNL  EXT: WNL    L shoulder MMT: 4/5 except ER at 90 deg and abduction= 3/5     Objective testing:  Apprehension (negative)  Lupillo:  positive  Painful arc: positive     Unable to initiate pure abduction when laying supine.       Goals:   Short Term Goals:     1. Pt will achieve 90 degrees of AROM of L shoulder abd pain free or near pain free (2/10 or lower on numeric pain rating scale)  2. Improve L shoulder ER at 90 deg to 4/5  Short term goal time span:  1-2 weeks      Long Term Goals:      1. Pt will achieve 90% of L  strength as compared to R  strength  2. Pt will achieve AROM of L shoulder abd within normal limits (80% of full)  3. Pt reports less than 15% disability on the quick DASH  4. Pt is able to place a 10# object in an overhead shelf.     Long term goal time span:  6-8 weeks    Plan:   Planned therapy interventions:  Functional Training, Self Care (CPT 84866), Therapeutic Activities (CPT 25041), Therapeutic Exercise (CPT 69845), Hot or Cold Pack Therapy (CPT 44148), Manual Therapy (CPT 61891), Neuromuscular Re-education (CPT 36873) and E Stim Unattended (CPT 91425)  Frequency:  1x week  Duration in weeks:  8      Referring provider co-signature:  I have reviewed this plan of care and my co-signature certifies the need for services.     Certification Period: 07/02/2025 to 08/27/25    Physician Signature: ________________________________ Date: ______________

## 2025-07-07 NOTE — OP THERAPY DAILY TREATMENT
Outpatient Physical Therapy  DAILY TREATMENT     [facility] Outpatient Physical Therapy  [address]  [place] [address]  Phone:  [phone]  Fax:  [phone]    Date: 07/08/2025    Patient: Shabana Bailon  YOB: 1950  MRN: [id number]     Time Calculation    Start time: 0800  Stop time: 0850 Time Calculation (min): 50 minutes         Chief Complaint: Shoulder Problem    Visit #: 5    SUBJECTIVE:  Continues to struggle with night pain. The pain is more localized since the CSI, but still nagging. CP or hot shower is most relieving. Considering a lidocaine patch or volteren gel. Scheduled for dry needling in 2 weeks. Functionally, feels like she is gaining reach.     OBJECTIVE:        Therapeutic Exercises (CPT 68207):     1. UBE, L3 x 4 min alt    2. SL shoulder abduction, 0# 2x 10, 2# 1x10 (max effort)    3. Prone jak, bottom half with palms down x 10, unable to perform turn over through mid range    5. shoulder ER at 90/90 on table with lift off, 2# 2x10      Therapeutic Exercise Summary:   Access Code: [email or web address]  URL: [email or web address]  Date: 06/19/2025  Prepared by:     Exercises  - Shoulder External Rotation with Anchored Resistance  - 5 x weekly - 2 sets - 15 reps  - Supine Chest Stretch with Elbows Bent  - 1 x daily - 2 minutes hold  - Standing Shoulder Horizontal Abduction with Resistance  - 1 x daily - 2-3 sets - 10 reps  - Seated Shoulder External Rotation in Abduction Supported with Dumbbell  - 1 x daily - 2-3 sets - 10 reps    Therapeutic Treatments and Modalities:     1. Manual Therapy (CPT 90012), Supine: Inf/post/ant end range GHJ mobs with PROM respectively. Isometric ER at end ranges of elevation and 90/90 ER. RROM flexion, ER at 90, PNF D2. x 5-10 reps each. Ktape for deltoid facilitation and RC gapping.    Time-based treatments/modalities:    Physical Therapy Timed Treatment Charges  Manual therapy minutes (CPT 87247): 15  minutes  Therapeutic exercise minutes (CPT 38420): 35 minutes    ASSESSMENT:   Response to treatment: Monitor response to Ktape and educate pt to don if appropriate. Primary deficit is recruitment of RC for IR/ER/stability in 90 deg abduction and above. Is able to recruit with pre-positioned in slight scaption, but will need more strength progressions to fully restore functional reach.     PLAN/RECOMMENDATIONS:   Plan for treatment: therapy treatment to continue next visit.  Planned interventions for next visit: continue with current treatment.  IR at 90 deg?

## 2025-07-08 ENCOUNTER — PHYSICAL THERAPY (OUTPATIENT)
Dept: PHYSICAL THERAPY | Facility: MEDICAL CENTER | Age: 75
End: 2025-07-08
Attending: FAMILY MEDICINE
Payer: MEDICARE

## 2025-07-08 DIAGNOSIS — M25.512 ACUTE PAIN OF LEFT SHOULDER: Primary | ICD-10-CM

## 2025-07-08 DIAGNOSIS — S43.005S SHOULDER DISLOCATION, LEFT, SEQUELA: ICD-10-CM

## 2025-07-08 PROCEDURE — 97110 THERAPEUTIC EXERCISES: CPT

## 2025-07-08 PROCEDURE — 97140 MANUAL THERAPY 1/> REGIONS: CPT

## 2025-07-10 ENCOUNTER — HOSPITAL ENCOUNTER (OUTPATIENT)
Dept: RADIOLOGY | Facility: MEDICAL CENTER | Age: 75
End: 2025-07-10
Attending: NURSE PRACTITIONER
Payer: MEDICARE

## 2025-07-10 DIAGNOSIS — R79.89 ABNORMAL LIVER FUNCTION TEST: ICD-10-CM

## 2025-07-10 PROCEDURE — 76700 US EXAM ABDOM COMPLETE: CPT

## 2025-07-21 NOTE — OP THERAPY DAILY TREATMENT
"  Outpatient Physical Therapy  DAILY TREATMENT     [facility] Out[facility]  [address]  [place] [address]  Phone:  [phone]  Fax:  [phone]    Date: 07/22/2025    Patient: Shabana Bailon  YOB: 1950  MRN: [id number]     Time Calculation    Start time: 0843  Stop time: 0924 Time Calculation (min): 41 minutes         Chief Complaint: Shoulder Problem    Visit #: 6    SUBJECTIVE:  My pain is improving. I'm only having to use Volteren gel now and I am sleeping better. I am still having a lot of trouble trying to lift the arm (abduction) if I am in sidelying.     OBJECTIVE:        Therapeutic Exercises (CPT 92753):     1. UBE, L3 x 4 min alt    2. Bench press, 5# DB 2x10    3. Supine fly, 2# DB 2x10, does feel the pull in end range horizontal abduction    4. KB arm bar \"T\", 7.5# x 15, SBA for guarding    5. ball circles vs wall, in 90 abd CW/CCW x 20 ea      Therapeutic Exercise Summary:   Access Code: [email or web address]  URL: [email or web address]  Date: 06/19/2025  Prepared by:     Exercises  - Supine Chest Flys  - 5 x weekly - 2-3 sets - 10-15 reps  - Standing Wall Ball Circles in Scaption with Mini Swiss Ball  - 5 x weekly - 20 reps  - Standing Single Arm Shoulder Internal Rotation in Abduction with Anchored Resistance  - 5 x weekly - 2-3 sets - 10-15 reps      Time-based treatments/modalities:    Physical Therapy Timed Treatment Charges  Therapeutic exercise minutes (CPT 32770): 41 minutes    ASSESSMENT:   Response to treatment: Shoulder ER strength is improving. Load for IR now well tolerated as the irritability of the shoulder is reduced. Introduced to HEP to address subscap strain and create balance that will support AROM into abduction. Monitor response at follow up in 2-3 weeks.     PLAN/RECOMMENDATIONS:   Plan for treatment: therapy treatment to continue next visit.  Planned interventions for next visit: continue with current " treatment.

## 2025-07-22 ENCOUNTER — PHYSICAL THERAPY (OUTPATIENT)
Dept: PHYSICAL THERAPY | Facility: MEDICAL CENTER | Age: 75
End: 2025-07-22
Attending: FAMILY MEDICINE
Payer: MEDICARE

## 2025-07-22 DIAGNOSIS — M25.512 ACUTE PAIN OF LEFT SHOULDER: Primary | ICD-10-CM

## 2025-07-22 DIAGNOSIS — S43.005S SHOULDER DISLOCATION, LEFT, SEQUELA: ICD-10-CM

## 2025-07-22 PROCEDURE — 97110 THERAPEUTIC EXERCISES: CPT

## 2025-08-15 ENCOUNTER — PHYSICAL THERAPY (OUTPATIENT)
Dept: PHYSICAL THERAPY | Facility: MEDICAL CENTER | Age: 75
End: 2025-08-15
Attending: FAMILY MEDICINE
Payer: MEDICARE

## 2025-08-15 DIAGNOSIS — M25.512 ACUTE PAIN OF LEFT SHOULDER: Primary | ICD-10-CM

## 2025-08-15 DIAGNOSIS — S43.005S SHOULDER DISLOCATION, LEFT, SEQUELA: ICD-10-CM

## 2025-08-15 PROCEDURE — 97140 MANUAL THERAPY 1/> REGIONS: CPT

## 2025-08-15 PROCEDURE — 97110 THERAPEUTIC EXERCISES: CPT
